# Patient Record
Sex: MALE | Race: WHITE | Employment: FULL TIME | URBAN - METROPOLITAN AREA
[De-identification: names, ages, dates, MRNs, and addresses within clinical notes are randomized per-mention and may not be internally consistent; named-entity substitution may affect disease eponyms.]

---

## 2017-02-06 ENCOUNTER — OFFICE VISIT (OUTPATIENT)
Dept: FAMILY MEDICINE | Facility: CLINIC | Age: 42
End: 2017-02-06
Payer: OTHER MISCELLANEOUS

## 2017-02-06 VITALS
TEMPERATURE: 98.7 F | HEART RATE: 75 BPM | SYSTOLIC BLOOD PRESSURE: 144 MMHG | BODY MASS INDEX: 27.13 KG/M2 | WEIGHT: 179 LBS | DIASTOLIC BLOOD PRESSURE: 90 MMHG | HEIGHT: 68 IN

## 2017-02-06 DIAGNOSIS — S60.459A INFECTED SLIVER OF SKIN OF FINGER, INITIAL ENCOUNTER: Primary | ICD-10-CM

## 2017-02-06 DIAGNOSIS — L08.9 INFECTED SLIVER OF SKIN OF FINGER, INITIAL ENCOUNTER: Primary | ICD-10-CM

## 2017-02-06 PROCEDURE — 99213 OFFICE O/P EST LOW 20 MIN: CPT | Performed by: FAMILY MEDICINE

## 2017-02-06 RX ORDER — CEPHALEXIN 500 MG/1
500 CAPSULE ORAL 4 TIMES DAILY
Qty: 28 CAPSULE | Refills: 0 | Status: SHIPPED | OUTPATIENT
Start: 2017-02-06 | End: 2017-02-13

## 2017-02-06 NOTE — PROGRESS NOTES
SUBJECTIVE:                                                    Ed Arshad is a 41 year old male who presents to clinic today for the following health issues:  Chief Complaint   Patient presents with     Foreign Body in Skin     Pt has a sliver in his right middle finger.         Sliver-thorn in finger      Duration: 1 1/2 wks ago    Description (location/character/radiation): right middle finger    Intensity:  moderate, 5/10    Accompanying signs and symptoms: red, swollen, painful, hard to bend     History (similar episodes/previous evaluation): None    Precipitating or alleviating factors: he thought he got the whole thorn out when it happened, has started hurting over the past 4 days    Therapies tried and outcome: has put abx ointment on past couple days   Verified above history with patient.      Problem list and histories reviewed & adjusted, as indicated.  Additional history: as documented    Patient Active Problem List   Diagnosis     Moderate recurrent major depression (H)     Hyperlipidemia with target LDL less than 130     Elevated blood pressure reading without diagnosis of hypertension     Past Surgical History   Procedure Laterality Date     Uvulopalatopharyngoplasty  1/25/2012     Procedure:UVULOPALATOPHARYNGOPLASTY; Uvuloplasty  ; Surgeon:ARIC SAUCEDO; Location:[place] OR       Social History   Substance Use Topics     Smoking status: Current Every Day Smoker -- 0.25 packs/day for 20 years     Types: Cigarettes     Smokeless tobacco: Never Used     Alcohol Use: Yes      Comment: weekends     Family History   Problem Relation Age of Onset     Hypertension Mother      Depression Mother      Other Cancer Mother      Skin cancer.     Hypertension Father      CEREBROVASCULAR DISEASE Father      Thyroid Disease Father      Prostate Cancer Father      Hypertension Brother          Current Outpatient Prescriptions   Medication Sig Dispense Refill     cephALEXin (KEFLEX) 500 MG capsule Take 1  "capsule (500 mg) by mouth 4 times daily for 7 days 28 capsule 0     Allergies   Allergen Reactions     Nkda [No Known Drug Allergies]        ROS:  C: NEGATIVE for fever, chills, change in weight  INTEGUMENTARY/SKIN: see above  MUSCULOSKELETAL: see above  H: NEGATIVE for bleeding problems   OBJECTIVE:                                                    /90 mmHg  Pulse 75  Temp(Src) 98.7  F (37.1  C) (Tympanic)  Ht 5' 8.25\" (1.734 m)  Wt 179 lb (81.194 kg)  BMI 27.00 kg/m2  Body mass index is 27 kg/(m^2).  GEN: alert, oriented x 3, NAD  SKIN: no open wound  RIGHT MIDDLE FINGER: PIP with trace swelling and trace erythema of the dorsal aspect with mild tenderness on palpation but no fluctuance; slight limited flexion due tightness; no visible puncture site or palpable FB.     Diagnostic test results:  Diagnostic Test Results:  none      ASSESSMENT/PLAN:                                                        ICD-10-CM    1. Infected sliver of skin of finger, initial encounter S60.459A cephALEXin (KEFLEX) 500 MG capsule    L08.9    Discussed with patient no sign of fluctuance that would need I&D.   Discussed localized infection likely; discussed course and tx.  Patient concurred with empiric abx as above.  Discussed blind exploration for retained FB is difficult and carries more risk than benefit.  Patient concurred.  Return precautions discussed and given to patient.        Follow up with Provider - 2-3 days if worsening   Patient Instructions   Likely localized infection or cellulitis of the middle finger midshaft.  Start Cephalexin as prescribed x 7 days.  Warm compress 10 mins 2-3 x a day for a few days.  If worsening, severe pain, fever or skin breakdown, see provider promptly.  Cellulitis  Cellulitis is an infection of the deep layers of skin. A break in the skin, such as a cut or scratch, can let bacteria under the skin. If the bacteria get to deep layers of the skin, it can be serious. If not treated, " cellulitis can get into the bloodstream and lymph nodes. The infection can then spread throughout the body. This causes serious illness.  Cellulitis causes the affected skin to become red, swollen, warm, and sore. The reddened areas have a visible border. An open sore may leak fluid (pus). You may have a fever, chills, and pain.  Cellulitis is treated with antibiotics taken for 7 to 10 days. An open sore may be cleaned and covered with cool wet gauze. Symptoms should get better 1 to 2 days after treatment is started. Make sure to take all the antibiotics for the full number of days until they are gone. Keep taking the medicine even if your symptoms go away.  Home care  Follow these tips:    Limit the use of the part of your body with cellulitis. Movement can cause the infection to spread.    If the infection is on your leg, walk as little as possible in the first few days of the treatment. Keep your leg raised while sitting. This will help to reduce swelling.    Take all of the antibiotic medicine exactly as directed until it is gone. Do not miss any doses, especially during the first 7 days. Don t stop taking the medicine when your symptoms get better.    Keep the affected area clean and dry.    Wash your hands with soap and warm water before and after touching your skin. Anyone else who touches your skin should also wash his or her hands. Don't share towels.  Follow-up care  Follow up with your healthcare provider. If your infection does not go away on 1 antibiotic, your healthcare provider will prescribe a different one.  When to seek medical advice  Call your healthcare provider right away if any of these occur:    Red areas that spread    Swelling or pain that gets worse    Fluid leaking from the skin (pus)    Fever higher of 100.4  F (38.0  C) or higher after 2 days on antibiotics    2418-6362 [employer]. [address] [place] [address], [place] [address]. All rights reserved. This information is not  intended as a substitute for professional medical care. Always follow your healthcare professional's instructions.              Mk Crawford MD  [facility]

## 2017-02-06 NOTE — MR AVS SNAPSHOT
"              After Visit Summary   2/6/2017    Ed Arshad    MRN: [id number]           Patient Information     Date Of Birth          1975        Visit Information        Provider Department      2/6/2017 2:20 PM Mk Crawford MD [facility]        Today's Diagnoses     Infected sliver of skin of finger, initial encounter    -  1       Care Instructions    Likely localized infection or cellulitis of the middle finger midshaft.  Start Cephalexin as prescribed x 7 days.  Warm compress 10 mins 2-3 x a day for a few days.  If worsening, severe pain, fever or skin breakdown, see provider promptly.        Follow-ups after your visit        Who to contact     If you have questions or need follow up information about today's clinic visit or your schedule please contact [facility] directly at [phone].  Normal or non-critical lab and imaging results will be communicated to you by [employer], letter or phone within 4 business days after the clinic has received the results. If you do not hear from us within 7 days, please contact the clinic through MyChart or phone. If you have a critical or abnormal lab result, we will notify you by phone as soon as possible.  Submit refill requests through [employer] or call your pharmacy and they will forward the refill request to us. Please allow 3 business days for your refill to be completed.          Additional Information About Your Visit        MyChart Information     Med Access lets you send messages to your doctor, view your test results, renew your prescriptions, schedule appointments and more. To sign up, go to [email or web address] . Click on \"Log in\" on the left side of the screen, which will take you to the Welcome page. Then click on \"Sign up Now\" on the right side of the page.     You will be asked to enter the access code listed below, as well as some personal information. Please follow the directions to create your username " "and password.     Your access code is: [email or web address]  Expires: 2017  2:26 PM     Your access code will  in 90 days. If you need help or a new code, please call your [place] clinic or [phone].        Care EveryWhere ID     This is your Care EveryWhere ID. This could be used by other organizations to access your [place] medical records  [id number]        Your Vitals Were     Pulse Temperature Height BMI (Body Mass Index)          75 98.7  F (37.1  C) (Tympanic) 5' 8.25\" (1.734 m) 27.00 kg/m2         Blood Pressure from Last 3 Encounters:   17 144/90   16 131/86   03/15/16 136/96    Weight from Last 3 Encounters:   17 179 lb (81.194 kg)   16 174 lb (78.926 kg)   03/15/16 182 lb (82.555 kg)              Today, you had the following     No orders found for display         Today's Medication Changes          These changes are accurate as of: 17  2:26 PM.  If you have any questions, ask your nurse or doctor.               Start taking these medicines.        Dose/Directions    cephALEXin 500 MG capsule   Commonly known as:  KEFLEX   Used for:  Infected sliver of skin of finger, initial encounter   Started by:  Mk Crawford MD        Dose:  500 mg   Take 1 capsule (500 mg) by mouth 4 times daily for 7 days   Quantity:  28 capsule   Refills:  0            Where to get your medicines      These medications were sent to [facility] [address]  [address] [address]     Phone:  [phone]    - cephALEXin 500 MG capsule             Primary Care Provider Office Phone # Fax #    Kevin Yu [phone]       [facility] [address] [address]        Thank you!     Thank you for choosing [facility]  for your care. Our goal is always to provide you with excellent care. Hearing back from our patients is one way we can continue to improve our services. " Please take a few minutes to complete the written survey that you may receive in the mail after your visit with us. Thank you!             Your Updated Medication List - Protect others around you: Learn how to safely use, store and throw away your medicines at www.disposemymeds.org.          This list is accurate as of: 2/6/17  2:26 PM.  Always use your most recent med list.                   Brand Name Dispense Instructions for use    cephALEXin 500 MG capsule    KEFLEX    28 capsule    Take 1 capsule (500 mg) by mouth 4 times daily for 7 days

## 2017-02-06 NOTE — NURSING NOTE
"Chief Complaint   Patient presents with     Foreign Body in Skin     Pt has a sliver in his right middle finger.       Initial /91 mmHg  Pulse 75  Temp(Src) 98.7  F (37.1  C) (Tympanic)  Ht 5' 8.25\" (1.734 m)  Wt 179 lb (81.194 kg)  BMI 27.00 kg/m2 Estimated body mass index is 27 kg/(m^2) as calculated from the following:    Height as of this encounter: 5' 8.25\" (1.734 m).    Weight as of this encounter: 179 lb (81.194 kg).  Medication Reconciliation: complete  Lisbeth Sampson CMA    "

## 2017-02-06 NOTE — PATIENT INSTRUCTIONS
Likely localized infection or cellulitis of the middle finger midshaft.  Start Cephalexin as prescribed x 7 days.  Warm compress 10 mins 2-3 x a day for a few days.  If worsening, severe pain, fever or skin breakdown, see provider promptly.  Cellulitis  Cellulitis is an infection of the deep layers of skin. A break in the skin, such as a cut or scratch, can let bacteria under the skin. If the bacteria get to deep layers of the skin, it can be serious. If not treated, cellulitis can get into the bloodstream and lymph nodes. The infection can then spread throughout the body. This causes serious illness.  Cellulitis causes the affected skin to become red, swollen, warm, and sore. The reddened areas have a visible border. An open sore may leak fluid (pus). You may have a fever, chills, and pain.  Cellulitis is treated with antibiotics taken for 7 to 10 days. An open sore may be cleaned and covered with cool wet gauze. Symptoms should get better 1 to 2 days after treatment is started. Make sure to take all the antibiotics for the full number of days until they are gone. Keep taking the medicine even if your symptoms go away.  Home care  Follow these tips:    Limit the use of the part of your body with cellulitis. Movement can cause the infection to spread.    If the infection is on your leg, walk as little as possible in the first few days of the treatment. Keep your leg raised while sitting. This will help to reduce swelling.    Take all of the antibiotic medicine exactly as directed until it is gone. Do not miss any doses, especially during the first 7 days. Don t stop taking the medicine when your symptoms get better.    Keep the affected area clean and dry.    Wash your hands with soap and warm water before and after touching your skin. Anyone else who touches your skin should also wash his or her hands. Don't share towels.  Follow-up care  Follow up with your healthcare provider. If your infection does not go away  on 1 antibiotic, your healthcare provider will prescribe a different one.  When to seek medical advice  Call your healthcare provider right away if any of these occur:    Red areas that spread    Swelling or pain that gets worse    Fluid leaking from the skin (pus)    Fever higher of 100.4  F (38.0  C) or higher after 2 days on antibiotics    6107-5014 The [employer]. [address] [place] [address]. All rights reserved. This information is not intended as a substitute for professional medical care. Always follow your healthcare professional's instructions.

## 2017-02-14 ENCOUNTER — OFFICE VISIT (OUTPATIENT)
Dept: FAMILY MEDICINE | Facility: CLINIC | Age: 42
End: 2017-02-14
Payer: OTHER MISCELLANEOUS

## 2017-02-14 ENCOUNTER — TELEPHONE (OUTPATIENT)
Dept: FAMILY MEDICINE | Facility: CLINIC | Age: 42
End: 2017-02-14

## 2017-02-14 VITALS
TEMPERATURE: 98.6 F | HEART RATE: 95 BPM | HEIGHT: 68 IN | SYSTOLIC BLOOD PRESSURE: 128 MMHG | BODY MASS INDEX: 27.58 KG/M2 | WEIGHT: 182 LBS | DIASTOLIC BLOOD PRESSURE: 82 MMHG

## 2017-02-14 DIAGNOSIS — L03.011 CELLULITIS OF MIDDLE FINGER, RIGHT: Primary | ICD-10-CM

## 2017-02-14 DIAGNOSIS — M79.89 SWELLING OF RIGHT MIDDLE FINGER: Primary | ICD-10-CM

## 2017-02-14 PROCEDURE — 99214 OFFICE O/P EST MOD 30 MIN: CPT | Performed by: FAMILY MEDICINE

## 2017-02-14 RX ORDER — CLINDAMYCIN HCL 300 MG
300 CAPSULE ORAL 4 TIMES DAILY
Qty: 40 CAPSULE | Refills: 0 | Status: SHIPPED | OUTPATIENT
Start: 2017-02-14 | End: 2017-03-24

## 2017-02-14 RX ORDER — INDOMETHACIN 25 MG/1
25 CAPSULE ORAL 2 TIMES DAILY PRN
Qty: 20 CAPSULE | Refills: 1 | Status: SHIPPED | OUTPATIENT
Start: 2017-02-14 | End: 2017-03-24

## 2017-02-14 NOTE — PROGRESS NOTES
SUBJECTIVE:                                                    Ed Arshad is a 41 year old male who presents to clinic today for the following health issues:  Chief Complaint   Patient presents with     Cellulitis     Pt here for a f/u on right middle finger cellultis.  No better.         F/u on cellulitis - right middle finger      Duration: 3 wks    Description (location/character/radiation): right middle finger, PIP; pt had thorn from [employer] bush he thought he removed, felt maybe he still had a little bit in there, finger started hurting 4-5 days later    Intensity:  7/10 with bending    Accompanying signs and symptoms: swelling, warm, mildly red skin over the area    History (similar episodes/previous evaluation): No similar episde in past.    Precipitating or alleviating factors: None    Therapies tried and outcome: pt just finished 7 day course of cephalexin last night    Patient states the joint feels tighter now and flexion is decreased compared to last week.       Problem list and histories reviewed & adjusted, as indicated.  Additional history: as documented    Patient Active Problem List   Diagnosis     Moderate recurrent major depression (H)     Hyperlipidemia with target LDL less than 130     Elevated blood pressure reading without diagnosis of hypertension     Past Surgical History   Procedure Laterality Date     Uvulopalatopharyngoplasty  1/25/2012     Procedure:UVULOPALATOPHARYNGOPLASTY; Uvuloplasty  ; Surgeon:ARIC SAUCEDO; Location:[place] OR       Social History   Substance Use Topics     Smoking status: Current Every Day Smoker     Packs/day: 0.25     Years: 20.00     Types: Cigarettes     Smokeless tobacco: Never Used     Alcohol use Yes      Comment: weekends     Family History   Problem Relation Age of Onset     Hypertension Mother      Depression Mother      Other Cancer Mother      Skin cancer.     Hypertension Father      CEREBROVASCULAR DISEASE Father      Thyroid Disease  "Father      Prostate Cancer Father      Hypertension Brother          Current Outpatient Prescriptions   Medication Sig Dispense Refill     clindamycin (CLEOCIN) 300 MG capsule Take 1 capsule (300 mg) by mouth 4 times daily 40 capsule 0     Allergies   Allergen Reactions     Nkda [No Known Drug Allergies]        ROS:  C: NEGATIVE for fever, chills, change in weight  INTEGUMENTARY/SKIN: see above  MUSCULOSKELETAL: see above  H: NEGATIVE for bleeding problems    OBJECTIVE:                                                    /82 (BP Location: Right arm, Patient Position: Chair, Cuff Size: Adult Regular)  Pulse 95  Temp 98.6  F (37  C) (Tympanic)  Ht 5' 8.25\" (1.734 m)  Wt 182 lb (82.6 kg)  BMI 27.47 kg/m2  Body mass index is 27.47 kg/(m^2).  GEN: alert, oriented x 3, NAD  SKIN: good turgor; trace erythema on the right middle finger PIP area.  RIGHT MIDDLE FINGER: increased swelling over PIP with increased tenderness compared to previous visit; no open ulcer/wound; no fluctuance today; range of motion shows moderately limited flexion due to swelling/tightness.    Diagnostic test results:  Diagnostic Test Results:  none      ASSESSMENT/PLAN:                                                        ICD-10-CM    1. Cellulitis of middle finger, right L03.011 clindamycin (CLEOCIN) 300 MG capsule     ORTHO  REFERRAL  Increased swelling today with limited flexion.  Possible incomplete response to cephalexin. Trial of Clindamycin.  Due to possible retained foreign body, consult ortho for possible need to explore for it.  Return precautions discussed and given to patient.         Follow up with Provider - at ortho appt.   Patient Instructions   Start Clindamycin due to persistent cellulitis.    Due to possible retained foreign body, ortho referral has been sent.  You will be contacted in 1-2 business days to get a schedule for the orthopedic specialist.    If you have fever, expanding swelling, severe pain or break " in skin, go to ER or see provider promptly.        Mk Crawford MD  [facility]

## 2017-02-14 NOTE — NURSING NOTE
"Chief Complaint   Patient presents with     Cellulitis     Pt here for a f/u on right middle finger cellultis.  No better.       Initial /82 (BP Location: Right arm, Patient Position: Chair, Cuff Size: Adult Regular)  Pulse 95  Temp 98.6  F (37  C) (Tympanic)  Ht 5' 8.25\" (1.734 m)  Wt 182 lb (82.6 kg)  BMI 27.47 kg/m2 Estimated body mass index is 27.47 kg/(m^2) as calculated from the following:    Height as of this encounter: 5' 8.25\" (1.734 m).    Weight as of this encounter: 182 lb (82.6 kg).  Medication Reconciliation: complete  Lisbeth Sampson CMA    "

## 2017-02-14 NOTE — MR AVS SNAPSHOT
After Visit Summary   2/14/2017    Ed Arshad    MRN: [id number]           Patient Information     Date Of Birth          1975        Visit Information        Provider Department      2/14/2017 9:40 AM Mk Crawford MD [facility]        Today's Diagnoses     Cellulitis of middle finger, right    -  1      Care Instructions    Start Clindamycin due to persistent cellulitis.    Due to possible retained foreign body, ortho referral has been sent.  You will be contacted in 1-2 business days to get a schedule for the orthopedic specialist.    If you have fever, expanding swelling, severe pain or break in skin, go to ER or see provider promptly.          Follow-ups after your visit        Additional Services     ORTHO  REFERRAL       [facility] is referring you to the Orthopedic  Services at [facility].       The  Representative will assist you in the coordination of your Orthopedic and Musculoskeletal Care as prescribed by your physician.    The  Representative will call you within 1 business day to help schedule your appointment, or you may contact the  Representative at:    All areas ~ [phone]     Type of Referral : Hand Ortho - persistent right middle finger swelling/cellulitis, possible retained foreign body       Timeframe requested: 1 - 2 days    Coverage of these services is subject to the terms and limitations of your health insurance plan.  Please call member services at your health plan with any benefit or coverage questions.      If X-rays, CT or MRI's have been performed, please contact the facility where they were done to arrange for , prior to your scheduled appointment.  Please bring this referral request to your appointment and present it to your specialist.                  Follow-up notes from your care team     Return at ortho appointment.      Who to  "contact     If you have questions or need follow up information about today's clinic visit or your schedule please contact [facility] directly at [phone].  Normal or non-critical lab and imaging results will be communicated to you by MyChart, letter or phone within 4 business days after the clinic has received the results. If you do not hear from us within 7 days, please contact the clinic through MyChart or phone. If you have a critical or abnormal lab result, we will notify you by phone as soon as possible.  Submit refill requests through [employer] or call your pharmacy and they will forward the refill request to us. Please allow 3 business days for your refill to be completed.          Additional Information About Your Visit        [employer] Information     [employer] lets you send messages to your doctor, view your test results, renew your prescriptions, schedule appointments and more. To sign up, go to [email or web address] . Click on \"Log in\" on the left side of the screen, which will take you to the Welcome page. Then click on \"Sign up Now\" on the right side of the page.     You will be asked to enter the access code listed below, as well as some personal information. Please follow the directions to create your username and password.     Your access code is: [email or web address]  Expires: 2017  2:26 PM     Your access code will  in 90 days. If you need help or a new code, please call your [place] clinic or [phone].        Care EveryWhere ID     This is your Care EveryWhere ID. This could be used by other organizations to access your [place] medical records  [id number]        Your Vitals Were     Pulse Temperature Height BMI (Body Mass Index)          95 98.6  F (37  C) (Tympanic) 5' 8.25\" (1.734 m) 27.47 kg/m2         Blood Pressure from Last 3 Encounters:   17 128/82   17 144/90   16 131/86    Weight from Last 3 Encounters:   17 182 lb (82.6 kg)   17 179 " lb (81.2 kg)   05/25/16 174 lb (78.9 kg)              We Performed the Following     ORTHO  REFERRAL          Today's Medication Changes          These changes are accurate as of: 2/14/17  9:59 AM.  If you have any questions, ask your nurse or doctor.               Start taking these medicines.        Dose/Directions    clindamycin 300 MG capsule   Commonly known as:  CLEOCIN   Used for:  Cellulitis of middle finger, right   Started by:  Mk Crawford MD        Dose:  300 mg   Take 1 capsule (300 mg) by mouth 4 times daily   Quantity:  40 capsule   Refills:  0            Where to get your medicines      These medications were sent to [facility] [address]  [address] [facility] [address] [facility] [address]     Phone:  [phone]     clindamycin 300 MG capsule                Primary Care Provider Office Phone # Fax #    Kevin Yu [phone]       [facility] [address] [facility] [address]        Thank you!     Thank you for choosing [facility]  for your care. Our goal is always to provide you with excellent care. Hearing back from our patients is one way we can continue to improve our services. Please take a few minutes to complete the written survey that you may receive in the mail after your visit with us. Thank you!             Your Updated Medication List - Protect others around you: Learn how to safely use, store and throw away your medicines at www.disposemymeds.org.          This list is accurate as of: 2/14/17  9:59 AM.  Always use your most recent med list.                   Brand Name Dispense Instructions for use    clindamycin 300 MG capsule    CLEOCIN    40 capsule    Take 1 capsule (300 mg) by mouth 4 times daily

## 2017-02-14 NOTE — TELEPHONE ENCOUNTER
Patient was seen today for right middle finger swelling.  He was given clindamycin today for cellulitis.  After revieewing his chart again, I recommend giving a trial of Indomethacin for next 2-3 days for the swelling.  If patient concurs with this, presctiption will be sent.

## 2017-02-14 NOTE — PATIENT INSTRUCTIONS
Start Clindamycin due to persistent cellulitis.    Due to possible retained foreign body, ortho referral has been sent.  You will be contacted in 1-2 business days to get a schedule for the orthopedic specialist.    If you have fever, expanding swelling, severe pain or break in skin, go to ER or see provider promptly.

## 2017-02-16 DIAGNOSIS — L03.011 CELLULITIS OF FINGER, RIGHT: Primary | ICD-10-CM

## 2017-02-16 LAB
BASOPHILS # BLD AUTO: 0.1 10E9/L (ref 0–0.2)
BASOPHILS NFR BLD AUTO: 0.8 %
CRP SERPL-MCNC: <2.9 MG/L (ref 0–8)
DIFFERENTIAL METHOD BLD: ABNORMAL
EOSINOPHIL # BLD AUTO: 0.2 10E9/L (ref 0–0.7)
EOSINOPHIL NFR BLD AUTO: 1.5 %
ERYTHROCYTE [DISTWIDTH] IN BLOOD BY AUTOMATED COUNT: 13.3 % (ref 10–15)
ERYTHROCYTE [SEDIMENTATION RATE] IN BLOOD BY WESTERGREN METHOD: 3 MM/H (ref 0–15)
HCT VFR BLD AUTO: 50.4 % (ref 40–53)
HGB BLD-MCNC: 17.1 G/DL (ref 13.3–17.7)
LYMPHOCYTES # BLD AUTO: 1.6 10E9/L (ref 0.8–5.3)
LYMPHOCYTES NFR BLD AUTO: 13.5 %
MCH RBC QN AUTO: 29.9 PG (ref 26.5–33)
MCHC RBC AUTO-ENTMCNC: 33.9 G/DL (ref 31.5–36.5)
MCV RBC AUTO: 88 FL (ref 78–100)
MONOCYTES # BLD AUTO: 0.9 10E9/L (ref 0–1.3)
MONOCYTES NFR BLD AUTO: 7.8 %
NEUTROPHILS # BLD AUTO: 8.9 10E9/L (ref 1.6–8.3)
NEUTROPHILS NFR BLD AUTO: 76.4 %
PLATELET # BLD AUTO: 211 10E9/L (ref 150–450)
RBC # BLD AUTO: 5.72 10E12/L (ref 4.4–5.9)
WBC # BLD AUTO: 11.6 10E9/L (ref 4–11)

## 2017-02-16 PROCEDURE — 86140 C-REACTIVE PROTEIN: CPT | Performed by: ORTHOPAEDIC SURGERY

## 2017-02-16 PROCEDURE — 85025 COMPLETE CBC W/AUTO DIFF WBC: CPT | Performed by: ORTHOPAEDIC SURGERY

## 2017-02-16 PROCEDURE — 85652 RBC SED RATE AUTOMATED: CPT | Performed by: ORTHOPAEDIC SURGERY

## 2017-02-16 PROCEDURE — 36415 COLL VENOUS BLD VENIPUNCTURE: CPT | Performed by: ORTHOPAEDIC SURGERY

## 2017-03-04 ENCOUNTER — TRANSFERRED RECORDS (OUTPATIENT)
Dept: HEALTH INFORMATION MANAGEMENT | Facility: CLINIC | Age: 42
End: 2017-03-04

## 2017-03-06 ENCOUNTER — MYC MEDICAL ADVICE (OUTPATIENT)
Dept: FAMILY MEDICINE | Facility: CLINIC | Age: 42
End: 2017-03-06

## 2017-03-28 ENCOUNTER — HOSPITAL ENCOUNTER (OUTPATIENT)
Facility: CLINIC | Age: 42
Discharge: HOME | End: 2017-03-28
Attending: ORTHOPAEDIC SURGERY | Admitting: ORTHOPAEDIC SURGERY
Payer: OTHER MISCELLANEOUS

## 2017-03-28 VITALS
SYSTOLIC BLOOD PRESSURE: 139 MMHG | BODY MASS INDEX: 26.36 KG/M2 | TEMPERATURE: 98.2 F | WEIGHT: 178 LBS | HEIGHT: 69 IN | DIASTOLIC BLOOD PRESSURE: 95 MMHG | OXYGEN SATURATION: 97 % | RESPIRATION RATE: 18 BRPM

## 2017-03-28 PROCEDURE — S0020 INJECTION, BUPIVICAINE HYDRO: HCPCS | Performed by: ORTHOPAEDIC SURGERY

## 2017-03-28 PROCEDURE — 87070 CULTURE OTHR SPECIMN AEROBIC: CPT | Performed by: ORTHOPAEDIC SURGERY

## 2017-03-28 PROCEDURE — 25000128 H RX IP 250 OP 636: Performed by: ORTHOPAEDIC SURGERY

## 2017-03-28 PROCEDURE — 36000058 ZZH SURGERY LEVEL 3 EA 15 ADDTL MIN: Performed by: ORTHOPAEDIC SURGERY

## 2017-03-28 PROCEDURE — 87176 TISSUE HOMOGENIZATION CULTR: CPT | Performed by: ORTHOPAEDIC SURGERY

## 2017-03-28 PROCEDURE — 25800025 ZZH RX 258: Performed by: ORTHOPAEDIC SURGERY

## 2017-03-28 PROCEDURE — 87075 CULTR BACTERIA EXCEPT BLOOD: CPT | Performed by: ORTHOPAEDIC SURGERY

## 2017-03-28 PROCEDURE — 36000056 ZZH SURGERY LEVEL 3 1ST 30 MIN: Performed by: ORTHOPAEDIC SURGERY

## 2017-03-28 PROCEDURE — 71000027 ZZH RECOVERY PHASE 2 EACH 15 MINS: Performed by: ORTHOPAEDIC SURGERY

## 2017-03-28 PROCEDURE — 87102 FUNGUS ISOLATION CULTURE: CPT | Performed by: ORTHOPAEDIC SURGERY

## 2017-03-28 PROCEDURE — 27210794 ZZH OR GENERAL SUPPLY STERILE: Performed by: ORTHOPAEDIC SURGERY

## 2017-03-28 PROCEDURE — 40000305 ZZH STATISTIC PRE PROC ASSESS I: Performed by: ORTHOPAEDIC SURGERY

## 2017-03-28 PROCEDURE — 25000125 ZZHC RX 250: Performed by: ORTHOPAEDIC SURGERY

## 2017-03-28 RX ORDER — SODIUM CHLORIDE, SODIUM LACTATE, POTASSIUM CHLORIDE, CALCIUM CHLORIDE 600; 310; 30; 20 MG/100ML; MG/100ML; MG/100ML; MG/100ML
INJECTION, SOLUTION INTRAVENOUS CONTINUOUS
Status: DISCONTINUED | OUTPATIENT
Start: 2017-03-28 | End: 2017-03-28 | Stop reason: HOSPADM

## 2017-03-28 RX ORDER — BUPIVACAINE HYDROCHLORIDE 5 MG/ML
INJECTION, SOLUTION PERINEURAL PRN
Status: DISCONTINUED | OUTPATIENT
Start: 2017-03-28 | End: 2017-03-28 | Stop reason: HOSPADM

## 2017-03-28 RX ORDER — CEFAZOLIN SODIUM 2 G/100ML
2 INJECTION, SOLUTION INTRAVENOUS
Status: COMPLETED | OUTPATIENT
Start: 2017-03-28 | End: 2017-03-28

## 2017-03-28 RX ORDER — BACITRACIN ZINC 500 [USP'U]/G
OINTMENT TOPICAL PRN
Status: DISCONTINUED | OUTPATIENT
Start: 2017-03-28 | End: 2017-03-28 | Stop reason: HOSPADM

## 2017-03-28 RX ORDER — CEFAZOLIN SODIUM 1 G/3ML
1 INJECTION, POWDER, FOR SOLUTION INTRAMUSCULAR; INTRAVENOUS SEE ADMIN INSTRUCTIONS
Status: DISCONTINUED | OUTPATIENT
Start: 2017-03-28 | End: 2017-03-28 | Stop reason: HOSPADM

## 2017-03-28 RX ADMIN — LIDOCAINE HYDROCHLORIDE 1 ML: 10 INJECTION, SOLUTION INFILTRATION; PERINEURAL at 14:55

## 2017-03-28 RX ADMIN — SODIUM CHLORIDE, POTASSIUM CHLORIDE, SODIUM LACTATE AND CALCIUM CHLORIDE: 600; 310; 30; 20 INJECTION, SOLUTION INTRAVENOUS at 14:55

## 2017-03-28 RX ADMIN — CEFAZOLIN SODIUM 2 G: 2 INJECTION, SOLUTION INTRAVENOUS at 16:25

## 2017-03-28 NOTE — DISCHARGE INSTRUCTIONS
Same Day Surgery Discharge Instructions  Special Precautions After Surgery - Adult    1. It is not unusual to feel lightheaded or faint, up to 24 hours after surgery or while taking pain medication.  If you have these symptoms; sit for a few minutes before standing and have someone assist you when getting up.  2. You should rest and relax for the next 24 hours and must have someone stay with you for at least 24 hours after your discharge.  3. DO NOT DRIVE any vehicle or operate mechanical equipment for 24 hours following the end of your surgery.  DO NOT DRIVE while taking narcotic pain medications that have been prescribed by your physician.  If you had a limb operated on, you must be able to use it fully to drive.  4. DO NOT drink alcoholic beverages for 24 hours following surgery or while taking prescription pain medication.  5. Drink clear liquids (apple juice, ginger ale, broth, 7-Up, etc.).  Progress to your regular diet as you feel able.  6. Any questions call your physician and do not make important decisions for 24 hours.        __________________________________________________________________________________________________________________________________  IMPORTANT NUMBERS:    [facility] Main Number:  [phone], [phone]  Pharmacy:  [phone]  Same Day Surgery:  [phone], Monday - Friday until 8:30 p.m.  Urgent Care:  [phone]  Emergency Room:  [phone]      [facility]:  [phone]                                                                             [facility]:  [phone] option 1  [facility]:  [phone]     [facility] Clinic:  [phone]   [facility]:  [phone]   Home Medical Equipment: [phone]  [facility]:  [phone]

## 2017-03-28 NOTE — IP AVS SNAPSHOT
[facility] PreOP/Phase II    [address] [address]    Phone:  [phone]    Fax:  [phone]                                       After Visit Summary   3/28/2017    Ed Arshad    MRN: [id number]           After Visit Summary Signature Page     I have received my discharge instructions, and my questions have been answered. I have discussed any challenges I see with this plan with the nurse or doctor.    ..........................................................................................................................................  Patient/Patient Representative Signature      ..........................................................................................................................................  Patient Representative Print Name and Relationship to Patient    ..................................................               ................................................  Date                                            Time    ..........................................................................................................................................  Reviewed by Signature/Title    ...................................................              ..............................................  Date                                                            Time

## 2017-03-28 NOTE — BRIEF OP NOTE
[facility]  Brief Operative Note      Pre-operative diagnosis: Foreign body right long finger   Post-operative diagnosis: Same   Procedure: Right long finger I&D with foreign body removal   Surgeon: Marion Lafleur MD     Assistant(s): Demetra Camacho PA-C   Anesthesia: Local anesthesia   Estimated blood loss: Minimal               Drains: None   Specimens: Right long finger aerobic, anaerobic and fungal cultures       Findings: See full dictated operative note for details   Complications: None                   Comments: See dictated operative report for full details     Condition: Stable   Weight bearing status: Non-weight bearing   Activity: Activity as tolerated  Patient may move about with assist as indicated or with supervision   Anticoagulation plan:                 Mechanical and/or ambulation     Follow up plan                           Follow up in 10 day(s)

## 2017-03-28 NOTE — IP AVS SNAPSHOT
MRN:[id number]                      After Visit Summary   3/28/2017    Ed Arshad    MRN: [id number]           Thank you!     Thank you for choosing [facility] for your care. Our goal is always to provide you with excellent care. Hearing back from our patients is one way we can continue to improve our services. Please take a few minutes to complete the written survey that you may receive in the mail after you visit with us. Thank you!        Patient Information     Date Of Birth          1975        About your hospital stay     You were admitted on:  March 28, 2017 You last received care in the:  [facility] PreOP/Phase II    You were discharged on:  March 28, 2017        Reason for your hospital stay       Right long finger retained foreign body                  Who to Call     For medical emergencies, please call 911.  For non-urgent questions about your medical care, please call your primary care provider or clinic, [phone]  For questions related to your surgery, please call your surgery clinic        Attending Provider     Provider Marion Pope MD Orthopedics       Primary Care Provider Office Phone # Fax #    Kevin Louie Yu [phone]       [facility] [address] [address]         When to contact your care team       Call your primary doctor if you have any of the following: chest pain, troubles breathing, increased shortness of breath.  Call [facility] ([phone]) if you have any of the following: temperature greater than 100.5F, pain not controlled with elevation or pain medications, drainage that saturates the bandage.                  After Care Instructions     Activity       Keep your arm elevated above your heart for the next 2-3 days.  This will limit swelling and help with pain control.  It is ok to apply an ice bag to the area, but make sure there is no leak or chance for  condensation to cause your dressing to get damp.  Wet dressings can lead to infection.  Keep your sterile surgical dressing clean and dry.  Please do no remove any sooner than Friday evening.  Sponge bathing is recommended prior to Friday evening.  Otherwise, cover your arm with plastic bags secured around the upper arm if showering and hold your arm outside of the shower.  OK to move your fingers, wrist, and elbow.    On Friday evening, you may remove your surgical bandage.  The finger may then get wet under running water (showers or sink) only.  NO submerging the finger in standing water (dishwashing, pools, tub bathing).  Pat the finger dry after your shower and cover with a clean gauze dressing.  No heavy lifting, pushing, pulling with the surgical extremity.  No repetitive activities with the surgical hand/wrist.            Diet       Resume your pre-op diet                  Follow-up Appointments     Follow-up and recommended labs and tests        Follow up with Dr. Lafleur in about 10 days at [facility] ([phone]).  You may need to call to schedule this appointment if you do not already have a follow-up appointment scheduled.                  Further instructions from your care team                           Same Day Surgery Discharge Instructions  Special Precautions After Surgery - Adult    1. It is not unusual to feel lightheaded or faint, up to 24 hours after surgery or while taking pain medication.  If you have these symptoms; sit for a few minutes before standing and have someone assist you when getting up.  2. You should rest and relax for the next 24 hours and must have someone stay with you for at least 24 hours after your discharge.  3. DO NOT DRIVE any vehicle or operate mechanical equipment for 24 hours following the end of your surgery.  DO NOT DRIVE while taking narcotic pain medications that have been prescribed by your physician.  If you had a limb operated on, you must be able  "to use it fully to drive.  4. DO NOT drink alcoholic beverages for 24 hours following surgery or while taking prescription pain medication.  5. Drink clear liquids (apple juice, ginger ale, broth, 7-Up, etc.).  Progress to your regular diet as you feel able.  6. Any questions call your physician and do not make important decisions for 24 hours.        __________________________________________________________________________________________________________________________________  IMPORTANT NUMBERS:    [facility] Main Number:  [phone], [phone]  Pharmacy:  [phone]  Same Day Surgery:  [phone], Monday - Friday until 8:30 p.m.  Urgent Care:  [phone]  Emergency Room:  [phone]      [facility]:  [phone]                                                                             [facility]:  [phone] option 1  [facility]:  [phone]     [facility] Clinic:  [phone]   [facility]ty Clinic:  [phone]   Home Medical Equipment: [phone]  [facility]:  [phone]        Pending Results     No orders found from 3/26/2017 to 3/29/2017.            Admission Information     Date & Time Provider Department Dept. Phone    3/28/2017 Marion Lafleur MD [facility] PreOP/Phase [phone]      Your Vitals Were     Blood Pressure Temperature Respirations Height Weight Pulse Oximetry    139/95 98.2  F (36.8  C) (Oral) 18 1.74 m (5' 8.5\") 80.7 kg (178 lb) 97%    BMI (Body Mass Index)                   26.67 kg/m2           [facility] Information     [employer] gives you secure access to your electronic health record. If you see a primary care provider, you can also send messages to your care team and make appointments. If you have questions, please call your primary care clinic.  If you do not have a primary care provider, please call [phone] and they will assist you.        Care EveryWhere ID     This is your Care EveryWhere " ID. This could be used by other organizations to access your [facility] medical records  [id number]           Review of your medicines      Notice     You have not been prescribed any medications.             Protect others around you: Learn how to safely use, store and throw away your medicines at www.disposemymeds.org.             Medication List: This is a list of all your medications and when to take them. Check marks below indicate your daily home schedule. Keep this list as a reference.      Notice     You have not been prescribed any medications.

## 2017-03-29 NOTE — OP NOTE
DATE OF PROCEDURE:  03/28/2017      SURGEON:  Marion Lafleur MD   ASSISTANT:  Demetra Camacho PA-C      PREOPERATIVE DIAGNOSIS:  Right long finger retained foreign body.   POSTOPERATIVE DIAGNOSIS:  Right long finger retained foreign body.      PROCEDURE PERFORMED:   1.  Right long finger irrigation and debridement.   2.  Right long finger foreign body removal.      ANESTHESIA:  Local anesthesia for a left long finger digital block.   SPECIMENS:  Left long finger aerobic, anaerobic and fungal cultures.   DRAINS:  None.   COMPLICATIONS:  None known.      FINDINGS:  A small, organic-material appearing foreign body within the soft tissues immediately dorsal to the common extensor tendon and central slip at the level of the PIP joint.  No evidence of dirty-dishwater-type fluid or necrotic material or purulent fluid in the wound bed.      INDICATIONS:  Ed Arshad is a 41-year-old male who incarcerated a piece of brush in his right long finger several weeks ago.  The patient removed the foreign object himself.  He then went on to develop a superficial wound infection.  This was treated with 2 courses of outpatient antibiotics.  He then presented to my clinic with persistent pain and swelling.  Inflammatory labs were within normal limits.  He continued to have pain with conservative management despite attempts at edema control and therapy for range of motion.  MRI was obtained demonstrating a very small 2 mm foreign body within the dorsal soft tissues.  Treatment options were discussed with the patient at length including continued conservative management versus surgical intervention.  He understands the risks and benefits of each treatment option.  He further understands the risks of surgery include but are not limited to following:  Problems anesthesia, sensitive scars, bleeding, wound healing problems, joint stiffness, damage to surrounding anatomical structures including nerves, vessels or tendons, shree-incisional  numbness, persistent pain, infection, need for additional surgery despite our efforts today.  No guarantees were made or implied.  The patient expressed understanding and wished to proceed with surgical intervention.      DESCRIPTION OF PROCEDURE:  The patient was met in the preoperative holding area and the correct extremity, the right long finger was identified and marked.  He was brought to the operative suite and placed supine on the operating table.  Bony prominences were well padded.  A well-padded nonsterile tourniquet was placed on his right forearm.  Perioperative pause confirmed the correct patient, the correct procedure and the correct extremity.  Just prior to inflation of the tourniquet, palpation was used with the patient wide awake to identify the area of maximal tenderness.  This was marked with an ink pen.  A digital block was then performed with a 1:1 mixture of 1% lidocaine and 0.5% Marcaine plain.  The right upper extremity was then elevated, exsanguinated with an Esmarch bandage and the tourniquet insufflated to 250 mmHg.    Skin was incised sharply with a scalpel longitudinally over the dorsum of the PIP joint in the area of maximal tenderness to palpation.  Full-thickness skin flaps were raised.  Bipolar electrocautery was used for meticulous hemostasis.  Dissection proceeded carefully through subcutaneous tissues.  There was no evidence of necrotic tissue, purulent or dirty dishwater-type fluid.  There was evidence of edema and swelling over the soft tissues immediately dorsal to the PIP joint and joint capsule.  A Bellingham blade was used to carefully incise the soft tissues longitudinally immediately over the common extensor tendon at the central slip.  The common extensor tendon and central slip were not violated and all dissection proceeded superficially to this.  In these superficial tissues a very small piece of organic particulate matter less than 5 mm in area was identified.  A Bellingham  blade was used to carefully excise this as well as surrounding soft tissue, again preserving the underlying common extensor tendon along with its insertion into the central slip.  The underlying extensor tendon and dorsal joint capsule were examined and were found to be without foreign body or traumatic injury.  The patient was then asked to flex and extend the digit multiple times.  There was no evidence of fluid pockets.  The tissue including the retained foreign body was sent a specimen for aerobic and fungal cultures.  Culture swabs for aerobic anaerobic and fungal cultures were obtained at this level as well.  The tourniquet was then deflated after approximately 23 minutes with brisk return of capillary refill to all digits including the long finger.      Wound was copiously irrigated with sterile saline using a hemostat and curet to gently debride the skin and subcutaneous tissues down to the level of the common extensor tendon.  Following the irrigation and debridement, the wound was reapproximated with interrupted sutures using 4-0 nylon.  Sterile dressings were applied consisting of bacitracin, Adaptic, 2 x 2 gauze and a lightly wrapped Rubin gauze followed by a lightly wrapped Coban dressing.  The patient was then taken to recovery in stable condition having tolerated the procedure well.  Sponge and instrument counts were correct at the conclusion of the procedure, which was performed under loupe magnification.      POSTOPERATIVE PLAN:  The patient may change his dressing after 72 hours.  He is to avoid submerging the wound in standing water.  He may continue with range of motion activities as tolerated but is to avoid heavy repetitive lifting or gripping or grasping.  He will return to clinic in approximately 10 days for wound check and suture removal.  Followup intraoperative cultures.  A prescription for Norco 5/325 dispensed today.         MADHAVI OLIVEIRA MD             D: 03/28/2017 17:00   T:  2017 21:53   MT: [id number]      Name:     MASHA AVILA   MRN:      [id number]        Account:        [id number]   :      1975           Procedure Date: 2017      Document: [id number]

## 2017-04-02 LAB
BACTERIA SPEC CULT: NO GROWTH
BACTERIA SPEC CULT: NO GROWTH
Lab: NORMAL
MICRO REPORT STATUS: NORMAL
MICRO REPORT STATUS: NORMAL
SPECIMEN SOURCE: NORMAL
SPECIMEN SOURCE: NORMAL

## 2017-04-04 LAB
BACTERIA SPEC CULT: NORMAL
Lab: NORMAL
MICRO REPORT STATUS: NORMAL
SPECIMEN SOURCE: NORMAL

## 2017-04-25 LAB
FUNGUS SPEC CULT: NORMAL
FUNGUS SPEC CULT: NORMAL
Lab: NORMAL
MICRO REPORT STATUS: NORMAL
MICRO REPORT STATUS: NORMAL
SPECIMEN SOURCE: NORMAL
SPECIMEN SOURCE: NORMAL

## 2018-02-15 ENCOUNTER — HOSPITAL ENCOUNTER (EMERGENCY)
Facility: CLINIC | Age: 43
Discharge: HOME | End: 2018-02-15
Attending: PHYSICIAN ASSISTANT | Admitting: PHYSICIAN ASSISTANT
Payer: COMMERCIAL

## 2018-02-15 ENCOUNTER — APPOINTMENT (OUTPATIENT)
Dept: GENERAL RADIOLOGY | Facility: CLINIC | Age: 43
End: 2018-02-15
Attending: PHYSICIAN ASSISTANT
Payer: COMMERCIAL

## 2018-02-15 VITALS
HEART RATE: 98 BPM | SYSTOLIC BLOOD PRESSURE: 136 MMHG | DIASTOLIC BLOOD PRESSURE: 84 MMHG | OXYGEN SATURATION: 99 % | RESPIRATION RATE: 20 BRPM | TEMPERATURE: 99.7 F

## 2018-02-15 DIAGNOSIS — J40 BRONCHITIS: ICD-10-CM

## 2018-02-15 PROCEDURE — 71046 X-RAY EXAM CHEST 2 VIEWS: CPT

## 2018-02-15 PROCEDURE — 99213 OFFICE O/P EST LOW 20 MIN: CPT | Performed by: PHYSICIAN ASSISTANT

## 2018-02-15 PROCEDURE — G0463 HOSPITAL OUTPT CLINIC VISIT: HCPCS | Mod: 25

## 2018-02-15 RX ORDER — BENZONATATE 100 MG/1
100 CAPSULE ORAL 3 TIMES DAILY PRN
Qty: 42 CAPSULE | Refills: 0 | Status: SHIPPED | OUTPATIENT
Start: 2018-02-15

## 2018-02-15 RX ORDER — DOXYCYCLINE 100 MG/1
100 CAPSULE ORAL 2 TIMES DAILY
Qty: 20 CAPSULE | Refills: 0 | Status: SHIPPED | OUTPATIENT
Start: 2018-02-15 | End: 2018-02-25

## 2018-02-15 NOTE — ED AVS SNAPSHOT
[facility] Emergency Department    [address] [facility] [address]    Phone:  [phone]    Fax:  [phone]                                       Ed Arshad   MRN: [id number]    Department:  [facility] Emergency Department   Date of Visit:  2/15/2018           Patient Information     Date Of Birth          1975        Your diagnoses for this visit were:     Bronchitis        You were seen by Chela Crenshaw PA-C.      Follow-up Information     Follow up with Kevin Yu MD In 3 days.    Specialty:  Family Practice    Why:  As needed if no improvement or sooner if symptoms worsen    Contact information:    [address] [facility] [address]  [phone]          Discharge Instructions         What Is Acute Bronchitis?  Acute bronchitis is when the airways in your lungs (bronchial tubes) become red and swollen (inflamed). It is usually caused by a viral infection. But it can also occur because of a bacteria or allergen. Symptoms include a cough that produces yellow or greenish mucus and can last for days or sometimes weeks.  Inside healthy lungs    Air travels in and out of the lungs through the airways. The linings of these airways produce sticky mucus. This mucus traps particles that enter the lungs. Tiny structures called cilia then sweep the particles out of the airways.     Healthy airway: Airways are normally open. Air moves in and out easily.      Healthy cilia: Tiny, hairlike cilia sweep mucus and particles up and out of the airways.   Lungs with bronchitis  Bronchitis often occurs with a cold or the flu virus. The airways become inflamed (red and swollen). There is a deep hacking cough from the extra mucus. Other symptoms may include:    Wheezing    Chest discomfort    Shortness of breath    Mild fever  A second infection, this time due to bacteria, may then occur. And airways irritated by allergens or smoke are more likely to get infected.        Inflamed  airway: Inflammation and extra mucus narrow the airway, causing shortness of breath.      Impaired cilia: Extra mucus impairs cilia, causing congestion and wheezing. Smoking makes the problem worse.   Making a diagnosis  A physical exam, health history, and certain tests help your healthcare provider make the diagnosis.  Health history  Your healthcare provider will ask you about your symptoms.  The exam  Your provider listens to your chest for signs of congestion. He or she may also check your ears, nose, and throat.  Possible tests    A sputum test for bacteria. This requires a sample of mucus from your lungs.    A nasal or throat swab. This tests to see if you have a bacterial infection.    A chest X-ray. This is done if your healthcare provider thinks you have pneumonia.    Tests to check for an underlying condition. Other tests may be done to check for things such as allergies, asthma, or COPD (chronic obstructive pulmonary disease). You may need to see a specialist for more lung function testing.  Treating a cough  The main treatment for bronchitis is easing symptoms. Avoiding smoke, allergens, and other things that trigger coughing can often help. If the infection is bacterial, you may be given antibiotics. During the illness, it's important to get plenty of sleep. To ease symptoms:    Don t smoke. Also avoid secondhand smoke.    Use a humidifier. Or try breathing in steam from a hot shower. This may help loosen mucus.    Drink a lot of water and juice. They can soothe the throat and may help thin mucus.    Sit up or use extra pillows when in bed. This helps to lessen coughing and congestion.    Ask your provider about using medicine. Ask about using cough medicine, pain and fever medicine, or a decongestant.  Antibiotics  Most cases of bronchitis are caused by cold or flu viruses. They don t need antibiotics to treat them, even if your mucus is thick and green or yellow. Antibiotics don t treat viral illness  and antibiotics have not been shown to have any benefit in cases of acute bronchitis. Taking antibiotics when they are not needed increases your risk of getting an infection later that is antibiotic-resistant. Antibiotics can also cause severe cases of diarrhea that require other antibiotics to treat.  It is important that you accept your healthcare provider's opinion to not use antibiotics. Your provider will prescribe antibiotics if the infection is caused by bacteria. If they are prescribed:    Take all of the medicine. Take the medicine until it is used up, even if symptoms have improved. If you don t, the bronchitis may come back.    Take the medicines as directed. For instance, some medicines should be taken with food.    Ask about side effects. Ask your provider or pharmacist what side effects are common, and what to do about them.  Follow-up care  You should see your provider again in 2 to 3 weeks. By this time, symptoms should have improved. An infection that lasts longer may mean you have a more serious problem.  Prevention    Avoid tobacco smoke. If you smoke, quit. Stay away from smoky places. Ask friends and family not to smoke around you, or in your home or car.    Get checked for allergies.    Ask your provider about getting a yearly flu shot. Also ask about pneumococcal or pneumonia shots.    Wash your hands often. This helps reduce the chance of picking up viruses that cause colds and flu.  Call your healthcare provider if:    Symptoms worsen, or you have new symptoms    Breathing problems worsen or  become severe    Symptoms don t get better within a week, or within 3 days of taking antibiotics   Date Last Reviewed: 2/1/2017 2000-2017 [employer]. [address] [place] [address], [place] [address]. All rights reserved. This information is not intended as a substitute for professional medical care. Always follow your healthcare professional's instructions.          24 Hour Appointment  Hotline       To make an appointment at any [facility], call [phone] ([phone]). If you don't have a family doctor or clinic, we will help you find one. [facility] are conveniently located to serve the needs of you and your family.             Review of your medicines      START taking        Dose / Directions Last dose taken    benzonatate 100 MG capsule   Commonly known as:  TESSALON   Dose:  100 mg   Quantity:  42 capsule        Take 1 capsule (100 mg) by mouth 3 times daily as needed   Refills:  0        doxycycline 100 MG capsule   Commonly known as:  VIBRAMYCIN   Dose:  100 mg   Quantity:  20 capsule        Take 1 capsule (100 mg) by mouth 2 times daily for 10 days   Refills:  0                Prescriptions were sent or printed at these locations (2 Prescriptions)                   [facility] [place] [address] [facility]   [address] [facility] [address]    Telephone:  [phone]   Fax:  [phone]   Hours:                  E-Prescribed (2 of 2)         doxycycline (VIBRAMYCIN) 100 MG capsule               benzonatate (TESSALON) 100 MG capsule                Procedures and tests performed during your visit     Chest XR,  PA & LAT      Orders Needing Specimen Collection     None      Pending Results     Date and Time Order Name Status Description    2/15/2018 1803 Chest XR,  PA & LAT Preliminary             Pending Culture Results     No orders found from 2/13/2018 to 2/16/2018.            Pending Results Instructions     If you had any lab results that were not finalized at the time of your Discharge, you can call the ED Lab Result RN at [phone]. You will be contacted by this team for any positive Lab results or changes in treatment. The nurses are available 7 days a week from 10A to 6:30P.  You can leave a message 24 hours per day and they will return your call.        Test Results From Your Hospital Stay        2/15/2018  6:29 PM      Narrative      CHEST TWO VIEWS  2/15/2018 6:20 PM     HISTORY: Cough. Fever. Rule out pneumonia.    COMPARISON: Chest x-ray 4/20/2015.        Impression     IMPRESSION: PA and lateral views of the chest. Lungs are clear. Heart  is normal in size. No effusions are evident. No pneumothorax.                  Thank you for choosing [facility]       Thank you for choosing [facility] for your care. Our goal is always to provide you with excellent care. Hearing back from our patients is one way we can continue to improve our services. Please take a few minutes to complete the written survey that you may receive in the mail after you visit with us. Thank you!        [employer] Information     [employer] gives you secure access to your electronic health record. If you see a primary care provider, you can also send messages to your care team and make appointments. If you have questions, please call your primary care clinic.  If you do not have a primary care provider, please call [phone] and they will assist you.        Care EveryWhere ID     This is your Care EveryWhere ID. This could be used by other organizations to access your [facility] medical records  [id number]        Equal Access to Services     VANI [facility] CountySEFERINO : Hadii manuel Painting, waaxda lugreg, qaybta kaalart woods, cesar gutierrez . So [facility] [phone].    ATENCIÓN: Si habla español, tiene a torrez disposición servicios gratuitos de asistencia lingüística. Llame al [phone].    We comply with applicable federal civil rights laws and [place] laws. We do not discriminate on the basis of race, color, national origin, age, disability, sex, sexual orientation, or gender identity.            After Visit Summary       This is your record. Keep this with you and show to your community pharmacist(s) and doctor(s) at your next visit.

## 2018-02-15 NOTE — ED AVS SNAPSHOT
[facility] Emergency Department    [address] [facility] [address]    Phone:  [phone]    Fax:  [phone]                                       Ed Arshad   MRN: [id number]    Department:  [facility] Emergency Department   Date of Visit:  2/15/2018           After Visit Summary Signature Page     I have received my discharge instructions, and my questions have been answered. I have discussed any challenges I see with this plan with the nurse or doctor.    ..........................................................................................................................................  Patient/Patient Representative Signature      ..........................................................................................................................................  Patient Representative Print Name and Relationship to Patient    ..................................................               ................................................  Date                                            Time    ..........................................................................................................................................  Reviewed by Signature/Title    ...................................................              ..............................................  Date                                                            Time

## 2018-02-16 NOTE — ED PROVIDER NOTES
History     Chief Complaint   Patient presents with     Fever     Cough     HPI  Ed Arshad is a 42 year old male who is in urgent care with concern over fever and cough which been present for the last 2 weeks.  Patient states he developed symptoms approximately 2 weeks ago.  He initially thought he was improving however over the last 3-4 days states that cough is become increasingly severe with increased temp up to 102, night sweats, and chills.  He has not had any shortness of breath, wheezing, nausea, vomiting, abdominal pain.  Denies any history of asthma, COPD or other breathing related disorders.  No close contacts with similar symptoms.    Problem List:    Patient Active Problem List    Diagnosis Date Noted     Elevated blood pressure reading without diagnosis of hypertension 03/15/2016     Priority: Medium     Hyperlipidemia with target LDL less than 130 11/06/2014     Priority: Medium     Diagnosis updated by automated process. Provider to review and confirm.       Moderate recurrent major depression (H) 08/20/2012     Priority: Medium        Past Medical History:    No past medical history on file.    Past Surgical History:    Past Surgical History:   Procedure Laterality Date     IRRIGATION AND DEBRIDEMENT FINGER, COMBINED Right 3/28/2017    Procedure: COMBINED IRRIGATION AND DEBRIDEMENT FINGER;  Surgeon: Marion Lafleur MD;  Location: [facility] OR     UVULOPALATOPHARYNGOPLASTY  1/25/2012    Procedure:UVULOPALATOPHARYNGOPLASTY; Uvuloplasty  ; Surgeon:ARIC SAUCEDO; Location:[facility] OR     Family History:    Family History   Problem Relation Age of Onset     Hypertension Mother      Depression Mother      Other Cancer Mother      Skin cancer.     Hypertension Father      CEREBROVASCULAR DISEASE Father      Thyroid Disease Father      Prostate Cancer Father      Hypertension Brother      Social History:  Marital Status:  Single [1]  Social History   Substance Use Topics     Smoking status: Current  Every Day Smoker     Packs/day: 0.25     Years: 20.00     Types: Cigarettes     Smokeless tobacco: Never Used     Alcohol use Yes      Comment: weekends      Medications:      No current outpatient prescriptions on file.    Review of Systems  CONSTITUTIONAL:POSITIVE for fever up to 102, chills, sweats  INTEGUMENTARY/SKIN: NEGATIVE for worrisome rashes, moles or lesions  EYES: NEGATIVE for vision changes or irritation  ENT/MOUTH: NEGATIVE for current ear, mouth and throat problems  RESP:POSITIVE for cough and NEGATIVE for shortness of breath, wheezing   GI: NEGATIVE for abdominal pain, diarrhea, nausea and vomiting  Physical Exam   BP: 136/84  Pulse: 98  Temp: 99.7  F (37.6  C)  Resp: 20  SpO2: 99 %  Physical Exam    GENERAL APPEARANCE:alert and no distress  EYES: EOMI,  PERRL, conjunctiva clear  HENT: ear canals and TM's normal.  Nose and mouth without ulcers, erythema or lesions  NECK: supple, nontender, no lymphadenopathy  RESP: faint crackles at left base, no wheezing   CV: regular rates and rhythm, normal S1 S2, no murmur noted  SKIN: no suspicious lesions or rashes  ED Course     ED Course     Procedures        Critical Care time:  none            Labs Ordered and Resulted from Time of ED Arrival Up to the Time of Departure from the ED - No data to display    Results for orders placed or performed during the hospital encounter of 02/15/18   Chest XR,  PA & LAT    Narrative    CHEST TWO VIEWS  2/15/2018 6:20 PM     HISTORY: Cough. Fever. Rule out pneumonia.    COMPARISON: Chest x-ray 4/20/2015.      Impression    IMPRESSION: PA and lateral views of the chest. Lungs are clear. Heart  is normal in size. No effusions are evident. No pneumothorax.    ARIC MERRITT MD     Assessments & Plan (with Medical Decision Making)     I have reviewed the nursing notes.    I have reviewed the findings, diagnosis, plan and need for follow up with the patient.       Discharge Medication List as of 2/15/2018  6:38 PM      START  taking these medications    Details   doxycycline (VIBRAMYCIN) 100 MG capsule Take 1 capsule (100 mg) by mouth 2 times daily for 10 days, Disp-20 capsule, R-0, E-Prescribe      benzonatate (TESSALON) 100 MG capsule Take 1 capsule (100 mg) by mouth 3 times daily as needed, Disp-42 capsule, R-0, E-Prescribe           Final diagnoses:   Bronchitis     2-year-old male presents to urgent care with concern over 2 week history of cough with fever.  Patient was afebrile with stable vital signs upon arrival.  Physical exam findings as described above.  As part of evaluation patient did have chest x-ray which was negative for acute infiltrate, pneumothorax, pleural effusion or change in cardiopulmonary vasculature.  Patient symptoms are  clinically consistent with chest x-ray differential would also favor acute bronchitis pneumonia despite negative, concurrent viral illnesses would include possibility of influenza.  Patient was discharged home stable with instructions to follow-up with primary care provider if no resolution of fever within the next 48-72 hours.  Worrisome reasons to return to the ER/UC sooner discussed.    Disclaimer: This note consists of symbols derived from keyboarding, dictation, and/or voice recognition software. As a result, there may be errors in the script that have gone undetected.  Please consider this when interpreting information found in the chart.    2/15/2018   [facility] EMERGENCY DEPARTMENT     Chela Crenshaw PA-C  02/19/18 2004

## 2018-02-16 NOTE — DISCHARGE INSTRUCTIONS

## 2018-03-02 ENCOUNTER — OFFICE VISIT (OUTPATIENT)
Dept: FAMILY MEDICINE | Facility: CLINIC | Age: 43
End: 2018-03-02
Payer: COMMERCIAL

## 2018-03-02 VITALS
BODY MASS INDEX: 26.98 KG/M2 | RESPIRATION RATE: 16 BRPM | OXYGEN SATURATION: 97 % | HEART RATE: 91 BPM | TEMPERATURE: 98.3 F | DIASTOLIC BLOOD PRESSURE: 82 MMHG | HEIGHT: 68 IN | WEIGHT: 178 LBS | SYSTOLIC BLOOD PRESSURE: 133 MMHG

## 2018-03-02 DIAGNOSIS — H61.23 BILATERAL IMPACTED CERUMEN: Primary | ICD-10-CM

## 2018-03-02 PROCEDURE — 99212 OFFICE O/P EST SF 10 MIN: CPT | Performed by: FAMILY MEDICINE

## 2018-03-02 ASSESSMENT — PAIN SCALES - GENERAL: PAINLEVEL: NO PAIN (0)

## 2018-03-02 NOTE — MR AVS SNAPSHOT
After Visit Summary   3/2/2018    Ed Arshad    MRN: [id number]           Patient Information     Date Of Birth          1975        Visit Information        Provider Department      3/2/2018 1:20 PM Kevin Yu MD [facility]        Today's Diagnoses     Bilateral impacted cerumen    -  1      Care Instructions    (H61.23) Bilateral impacted cerumen  (primary encounter diagnosis)  Comment:   Plan: REMOVE IMPACTED CERUMEN        We will irrigate the ears and prevention discussed. Follow up as needed. Avoid Q-tips in the ears.           Follow-ups after your visit        Who to contact     If you have questions or need follow up information about today's clinic visit or your schedule please contact [facility] directly at [phone].  Normal or non-critical lab and imaging results will be communicated to you by MyChart, letter or phone within 4 business days after the clinic has received the results. If you do not hear from us within 7 days, please contact the clinic through MyChart or phone. If you have a critical or abnormal lab result, we will notify you by phone as soon as possible.  Submit refill requests through [employer] or call your pharmacy and they will forward the refill request to us. Please allow 3 business days for your refill to be completed.          Additional Information About Your Visit        MyChart Information     [employer] gives you secure access to your electronic health record. If you see a primary care provider, you can also send messages to your care team and make appointments. If you have questions, please call your primary care clinic.  If you do not have a primary care provider, please call [phone] and they will assist you.        Care EveryWhere ID     This is your Care EveryWhere ID. This could be used by other organizations to access your [facility] medical records  [id number]        Your Vitals Were     Pulse Temperature  "Respirations Height Pulse Oximetry BMI (Body Mass Index)    91 98.3  F (36.8  C) (Tympanic) 16 5' 8.25\" (1.734 m) 97% 26.87 kg/m2       Blood Pressure from Last 3 Encounters:   03/02/18 133/82   02/15/18 136/84   03/28/17 (!) 139/95    Weight from Last 3 Encounters:   03/02/18 178 lb (80.7 kg)   03/28/17 178 lb (80.7 kg)   02/14/17 182 lb (82.6 kg)              We Performed the Following     REMOVE IMPACTED CERUMEN        Primary Care Provider Office Phone # Fax #    Kevin Yu [phone] [address] [facility] [address]        Equal Access to Services     VANI COOPER : Jacques samaniego Soijeoma, waaxda luqadaha, qaybta kaalmada adeegyajavier, cesar gutierrez . So [facility] [phone].    ATENCIÓN: Si habla español, tiene a torrez disposición servicios gratuitos de asistencia lingüística. Llame al [phone].    We comply with applicable federal civil rights laws and [place] laws. We do not discriminate on the basis of race, color, national origin, age, disability, sex, sexual orientation, or gender identity.            Thank you!     Thank you for choosing [facility]  for your care. Our goal is always to provide you with excellent care. Hearing back from our patients is one way we can continue to improve our services. Please take a few minutes to complete the written survey that you may receive in the mail after your visit with us. Thank you!             Your Updated Medication List - Protect others around you: Learn how to safely use, store and throw away your medicines at www.disposemymeds.org.          This list is accurate as of 3/2/18  1:39 PM.  Always use your most recent med list.                   Brand Name Dispense Instructions for use Diagnosis    benzonatate 100 MG capsule    TESSALON    42 capsule    Take 1 capsule (100 mg) by mouth 3 times daily as needed          "

## 2018-03-02 NOTE — PROGRESS NOTES
"  SUBJECTIVE:   Ed Arshad is a 42 year old male who presents to clinic today for the following health issues:      ENT Symptoms             Symptoms: cc Present Absent Comment   Fever/Chills   *    Fatigue   *    Muscle Aches   *    Eye Irritation   *    Sneezing   *    Nasal Emir/Drg   *    Sinus Pressure/Pain   *    Loss of smell   *    Dental pain   *    Sore Throat   *    Swollen Glands   *    Ear Pain/Fullness  *  No pain left side is plugged    Cough   *    Wheeze   *    Chest Pain   *    Shortness of breath   *    Rash   *    Other   *      Symptom duration:  x 2 days   Symptom severity:  moderate   Treatments tried:  none    Contacts:  pt is just getting over bronchitis         Current Outpatient Prescriptions:      benzonatate (TESSALON) 100 MG capsule, Take 1 capsule (100 mg) by mouth 3 times daily as needed (Patient not taking: Reported on 3/2/2018), Disp: 42 capsule, Rfl: 0    Patient Active Problem List   Diagnosis     Moderate recurrent major depression (H)     Hyperlipidemia with target LDL less than 130     Elevated blood pressure reading without diagnosis of hypertension       Blood pressure 133/82, pulse 91, temperature 98.3  F (36.8  C), temperature source Tympanic, resp. rate 16, height 5' 8.25\" (1.734 m), weight 178 lb (80.7 kg), SpO2 97 %.    Exam:  GENERAL APPEARANCE: healthy, alert and no distress  HENT: cerumen bilateral  NECK: no adenopathy, no asymmetry, masses, or scars and thyroid normal to palpation      (H61.23) Bilateral impacted cerumen  (primary encounter diagnosis)  Comment:   Plan: REMOVE IMPACTED CERUMEN        We will irrigate the ears and prevention discussed. Follow up as needed. Avoid Q-tips in the ears.     Kevin Yu        "

## 2018-03-02 NOTE — PATIENT INSTRUCTIONS
(H61.23) Bilateral impacted cerumen  (primary encounter diagnosis)  Comment:   Plan: REMOVE IMPACTED CERUMEN        We will irrigate the ears and prevention discussed. Follow up as needed. Avoid Q-tips in the ears.

## 2018-07-05 ENCOUNTER — APPOINTMENT (OUTPATIENT)
Dept: CT IMAGING | Facility: CLINIC | Age: 43
End: 2018-07-05
Attending: EMERGENCY MEDICINE
Payer: COMMERCIAL

## 2018-07-05 ENCOUNTER — HOSPITAL ENCOUNTER (EMERGENCY)
Facility: CLINIC | Age: 43
Discharge: HOME | End: 2018-07-05
Attending: EMERGENCY MEDICINE | Admitting: EMERGENCY MEDICINE
Payer: COMMERCIAL

## 2018-07-05 VITALS
TEMPERATURE: 98.1 F | SYSTOLIC BLOOD PRESSURE: 114 MMHG | RESPIRATION RATE: 16 BRPM | WEIGHT: 176 LBS | DIASTOLIC BLOOD PRESSURE: 77 MMHG | BODY MASS INDEX: 26.57 KG/M2 | OXYGEN SATURATION: 98 % | HEART RATE: 103 BPM

## 2018-07-05 DIAGNOSIS — H53.8 BLURRED VISION: ICD-10-CM

## 2018-07-05 DIAGNOSIS — F10.920 ALCOHOLIC INTOXICATION WITHOUT COMPLICATION (H): ICD-10-CM

## 2018-07-05 DIAGNOSIS — R20.2 NUMBNESS AND TINGLING: ICD-10-CM

## 2018-07-05 DIAGNOSIS — R20.0 NUMBNESS AND TINGLING: ICD-10-CM

## 2018-07-05 LAB
ALBUMIN SERPL-MCNC: 4.4 G/DL (ref 3.4–5)
ALP SERPL-CCNC: 99 U/L (ref 40–150)
ALT SERPL W P-5'-P-CCNC: 27 U/L (ref 0–70)
ANION GAP SERPL CALCULATED.3IONS-SCNC: 10 MMOL/L (ref 3–14)
APTT PPP: 32 SEC (ref 22–37)
AST SERPL W P-5'-P-CCNC: 34 U/L (ref 0–45)
BASOPHILS # BLD AUTO: 0.1 10E9/L (ref 0–0.2)
BASOPHILS NFR BLD AUTO: 0.7 %
BILIRUB SERPL-MCNC: 0.5 MG/DL (ref 0.2–1.3)
BUN SERPL-MCNC: 12 MG/DL (ref 7–30)
CALCIUM SERPL-MCNC: 8.7 MG/DL (ref 8.5–10.1)
CHLORIDE SERPL-SCNC: 109 MMOL/L (ref 94–109)
CO2 SERPL-SCNC: 22 MMOL/L (ref 20–32)
CREAT SERPL-MCNC: 0.96 MG/DL (ref 0.66–1.25)
DIFFERENTIAL METHOD BLD: ABNORMAL
EOSINOPHIL # BLD AUTO: 0.3 10E9/L (ref 0–0.7)
EOSINOPHIL NFR BLD AUTO: 2.6 %
ERYTHROCYTE [DISTWIDTH] IN BLOOD BY AUTOMATED COUNT: 13.2 % (ref 10–15)
ETHANOL SERPL-MCNC: 0.3 G/DL
GFR SERPL CREATININE-BSD FRML MDRD: 85 ML/MIN/1.7M2
GLUCOSE SERPL-MCNC: 97 MG/DL (ref 70–99)
HCT VFR BLD AUTO: 47.1 % (ref 40–53)
HGB BLD-MCNC: 16.3 G/DL (ref 13.3–17.7)
IMM GRANULOCYTES # BLD: 0 10E9/L (ref 0–0.4)
IMM GRANULOCYTES NFR BLD: 0.4 %
INR PPP: 0.9 (ref 0.86–1.14)
LYMPHOCYTES # BLD AUTO: 5.4 10E9/L (ref 0.8–5.3)
LYMPHOCYTES NFR BLD AUTO: 53.8 %
MCH RBC QN AUTO: 30 PG (ref 26.5–33)
MCHC RBC AUTO-ENTMCNC: 34.6 G/DL (ref 31.5–36.5)
MCV RBC AUTO: 87 FL (ref 78–100)
MONOCYTES # BLD AUTO: 0.6 10E9/L (ref 0–1.3)
MONOCYTES NFR BLD AUTO: 6.2 %
NEUTROPHILS # BLD AUTO: 3.7 10E9/L (ref 1.6–8.3)
NEUTROPHILS NFR BLD AUTO: 36.3 %
NRBC # BLD AUTO: 0 10*3/UL
NRBC BLD AUTO-RTO: 0 /100
PLATELET # BLD AUTO: 241 10E9/L (ref 150–450)
PLATELET # BLD EST: ABNORMAL 10*3/UL
POTASSIUM SERPL-SCNC: 4.1 MMOL/L (ref 3.4–5.3)
PROT SERPL-MCNC: 7.9 G/DL (ref 6.8–8.8)
RBC # BLD AUTO: 5.44 10E12/L (ref 4.4–5.9)
RBC MORPH BLD: NORMAL
SODIUM SERPL-SCNC: 141 MMOL/L (ref 133–144)
TROPONIN I SERPL-MCNC: <0.015 UG/L (ref 0–0.04)
VARIANT LYMPHS BLD QL SMEAR: PRESENT
WBC # BLD AUTO: 10.1 10E9/L (ref 4–11)

## 2018-07-05 PROCEDURE — 99285 EMERGENCY DEPT VISIT HI MDM: CPT | Mod: 25 | Performed by: EMERGENCY MEDICINE

## 2018-07-05 PROCEDURE — 93010 ELECTROCARDIOGRAM REPORT: CPT | Mod: Z6 | Performed by: EMERGENCY MEDICINE

## 2018-07-05 PROCEDURE — 93005 ELECTROCARDIOGRAM TRACING: CPT | Performed by: EMERGENCY MEDICINE

## 2018-07-05 PROCEDURE — 70450 CT HEAD/BRAIN W/O DYE: CPT

## 2018-07-05 PROCEDURE — 25000128 H RX IP 250 OP 636: Performed by: EMERGENCY MEDICINE

## 2018-07-05 PROCEDURE — 84484 ASSAY OF TROPONIN QUANT: CPT | Performed by: EMERGENCY MEDICINE

## 2018-07-05 PROCEDURE — 85610 PROTHROMBIN TIME: CPT | Performed by: EMERGENCY MEDICINE

## 2018-07-05 PROCEDURE — 85730 THROMBOPLASTIN TIME PARTIAL: CPT | Performed by: EMERGENCY MEDICINE

## 2018-07-05 PROCEDURE — 25000125 ZZHC RX 250: Performed by: EMERGENCY MEDICINE

## 2018-07-05 PROCEDURE — 80053 COMPREHEN METABOLIC PANEL: CPT | Performed by: EMERGENCY MEDICINE

## 2018-07-05 PROCEDURE — 96361 HYDRATE IV INFUSION ADD-ON: CPT | Performed by: EMERGENCY MEDICINE

## 2018-07-05 PROCEDURE — 85025 COMPLETE CBC W/AUTO DIFF WBC: CPT | Performed by: EMERGENCY MEDICINE

## 2018-07-05 PROCEDURE — 70496 CT ANGIOGRAPHY HEAD: CPT

## 2018-07-05 PROCEDURE — 96374 THER/PROPH/DIAG INJ IV PUSH: CPT | Mod: 59 | Performed by: EMERGENCY MEDICINE

## 2018-07-05 PROCEDURE — 96375 TX/PRO/DX INJ NEW DRUG ADDON: CPT | Performed by: EMERGENCY MEDICINE

## 2018-07-05 PROCEDURE — 80320 DRUG SCREEN QUANTALCOHOLS: CPT | Performed by: EMERGENCY MEDICINE

## 2018-07-05 RX ORDER — KETOROLAC TROMETHAMINE 30 MG/ML
15 INJECTION, SOLUTION INTRAMUSCULAR; INTRAVENOUS ONCE
Status: COMPLETED | OUTPATIENT
Start: 2018-07-05 | End: 2018-07-05

## 2018-07-05 RX ORDER — IOPAMIDOL 755 MG/ML
70 INJECTION, SOLUTION INTRAVASCULAR ONCE
Status: COMPLETED | OUTPATIENT
Start: 2018-07-05 | End: 2018-07-05

## 2018-07-05 RX ORDER — FENTANYL CITRATE 50 UG/ML
50-100 INJECTION, SOLUTION INTRAMUSCULAR; INTRAVENOUS EVERY 5 MIN PRN
Status: DISCONTINUED | OUTPATIENT
Start: 2018-07-05 | End: 2018-07-05 | Stop reason: HOSPADM

## 2018-07-05 RX ADMIN — IOPAMIDOL 70 ML: 755 INJECTION, SOLUTION INTRAVENOUS at 01:18

## 2018-07-05 RX ADMIN — KETOROLAC TROMETHAMINE 15 MG: 30 INJECTION, SOLUTION INTRAMUSCULAR at 02:18

## 2018-07-05 RX ADMIN — SODIUM CHLORIDE 100 ML: 9 INJECTION, SOLUTION INTRAVENOUS at 01:17

## 2018-07-05 RX ADMIN — MIDAZOLAM HYDROCHLORIDE 1 MG: 2 INJECTION, SOLUTION INTRAMUSCULAR; INTRAVENOUS at 01:33

## 2018-07-05 RX ADMIN — SODIUM CHLORIDE, POTASSIUM CHLORIDE, SODIUM LACTATE AND CALCIUM CHLORIDE 1000 ML: 600; 310; 30; 20 INJECTION, SOLUTION INTRAVENOUS at 01:33

## 2018-07-05 ASSESSMENT — ENCOUNTER SYMPTOMS
HEADACHES: 1
NAUSEA: 0
SPEECH DIFFICULTY: 0
NUMBNESS: 1
CHILLS: 0
NERVOUS/ANXIOUS: 1
CHEST TIGHTNESS: 0
VOMITING: 0
LIGHT-HEADEDNESS: 0
CONFUSION: 1
PALPITATIONS: 0
DIZZINESS: 0
COUGH: 0
BACK PAIN: 0
FEVER: 0
SHORTNESS OF BREATH: 0
ABDOMINAL PAIN: 0
FATIGUE: 0
APPETITE CHANGE: 0

## 2018-07-05 ASSESSMENT — VISUAL ACUITY
OD: 20/70
OS: 20/70

## 2018-07-05 NOTE — DISCHARGE INSTRUCTIONS
"  Alcohol Intoxication  Alcohol intoxication occurs when you drink alcohol faster than your liver can remove it from your system. The following facts are important to remember:    It can take 10 minutes or more to start to feel the effects of a drink, so you can easily get more intoxicated than you intended.    One drink may be more than 1 serving of alcohol. Depending on the drink, it can be 2 to 4 servings.    It takes about an hour for your body to metabolize (clear) 1 serving. If you have more than 1 drink, it can take a couple of hours or more.    Many things affect how drinks will affect you, including whether you ve eaten, how fast you drink, your size, how much you normally drink (or not), medicines you take, chronic diseases you have, and gender.  Signs and symptoms of alcohol poisoning  The following are signs and symptoms of alcohol poisoning:  Mild impairment    Reduced inhibitions    Slurred speech    Drowsiness    Decreased fine motor skills  Moderate impairment    Erratic behavior, aggression, depression    Impaired judgment    Confusion    Concentration difficulties    Coordination problems  Severe impairment    Vomiting    Seizures    Unconsciousness    Cold, clammy    Slow or irregular breathing    Hypothermia (low body temperature)    Coma  Health effects  Alcohol abuse causes health problems. Sometimes this can happen after only drinking a  little.\" There is no set number of drinks or amount of alcohol that defines too much. The more you drink at one time, and the more frequently you drink determine both the short-term and long-term health effects. It affects all parts of your body and your health, including your:    Brain. Alcohol is a central nervous system depressant. It can damage parts of the brain that affect your balance, memory, thinking, and emotions. It can cause memory loss, blackouts, depression, agitation, sleep cycle changes, and seizures. These changes may or may not be " reversible.    Heart and vascular system. Alcohol affects multiple areas. It can damage heart muscle causing cardiomyopathy, which is a weakening and stretching of the heart muscle. This can lead to trouble breathing, an irregular heartbeat, atrial fibrillation, leg swelling, and heart failure. It makes the blood vessels stiffen causing hypertension (high blood pressure). All of these problems increase your risk of having heart attacks or strokes.    Liver. Alcohol causes fat to build up in the liver, affecting its normal function. This increases the risk for hepatitis, leading to abdominal pain, appetite loss, jaundice, bleeding problems, liver fibrosis, and cirrhosis. This in turn can affect your ability to fight off infections, and can cause diabetes. The liver changes prevent it from removing toxins in your blood that can cause encephalopathy. Signs of this are confusion, altered level of consciousness, personality changes, memory loss, seizures, coma, and death.    Pancreas. Alcohol can cause inflammation of the pancreas, or pancreatitis. This can cause pain in your abdomen, fever, and diabetes.    Immune system. Alcohol weakens your immune system in a number of ways. It suppresses your immune system making it harder to fight off infections and colds. You will also have a higher risk of certain infections like pneumonia and tuberculosis.    Cancer risk. Alcohol raises your risk of cancer of the mouth, esophagus, pharynx, larynx, liver, and breast.    Sexual function. Alcohol abuse can also lead to sexual problems.  Alcohol use during pregnancy may cause permanent damage to the growing baby.  Home care  The following guidelines will help you care for yourself at home:    Don't drink any more alcohol.    Don't drive until all effects of the alcohol have worn off.    Don't operate machinery that can cause injuries.    Get lots of rest over the next few days. Drink plenty of water and other non-alcoholic liquids.  Try to eat regular meals.    If you have been drinking heavily on a daily basis, you may go through alcohol withdrawal. The usual symptoms last 3 to 4 days and may include nervousness, shakiness, nausea, sweating, sleeplessness, and can even cause seizures and a serious withdrawal symptom called delirium tremens, or DTs. During this time, it is best that you stay with family or friends who can help and support you. You can also admit yourself to a residential detox program. If your symptoms are severe (seizures, severe shakiness, confusion), contact your doctor or call an ambulance for help (see below).   Follow-up care  If alcohol is a problem in your life, these are some organizations that can help you:    Alcoholics Anonymous offers support through a self-help fellowship. There are no dues or fees. See the Yellow Pages and call for time and place of meetings. Find AA online at www.aa.org.    [employer] offers support to families of alcohol users. Contact 649-117-0341, or online at www.al-anotano.org.    [employer] [place] on Alcoholism and Drug Dependence can be reached at 525-426-1773, or online at www.ncadd.org.    There are also inpatient and residential alcohol detox programs. Check the Internet or phonebook Yellow Pages under  D[facility] Abuse [facility] Treatment [facility].   Call 911  Call 911 if any of these occur:    Trouble breathing or slow irregular breathing    Chest pain    Sudden weakness on one side of your body or sudden trouble speaking    Heavy bleeding or vomiting blood    Very drowsy or trouble awakening    Fainting or loss of consciousness    Rapid heart rate    Seizure  When to seek medical advice  Call your healthcare provider right away if any of these occur:    Severe shakiness     Fever of 100.4 F (38 C) or higher, or as directed by your healthcare provider    Confusion or hallucinations (seeing, hearing, or feeling things that are not there)    Pain in your upper abdomen that gets worse    Repeated  "vomiting  Date Last Reviewed: 6/1/2016 2000-2017 [employer]. [address] [place] [address], [place] [address]. All rights reserved. This information is not intended as a substitute for professional medical care. Always follow your healthcare professional's instructions.          Paraesthesias  Paraesthesia is a burning or prickling sensation that is sometimes felt in the hands, arms, legs or feet. It can also occur in other parts of the body. It can also feel like tingling or numbness, skin crawling, or itching. The feeling is not comfortable, but it is not painful. (The \"pins and needles\" feeling that happens when a foot or hand \"falls asleep\" is a temporary paraesthesia.)  Paraesthesias that last or come and go may be caused by medical issues that need to be treated. These include stroke, a bulging disk pressing on a nerve, a trapped nerve, vitamin deficiencies, or even certain medicines.  Tests are often done. These tests may include blood tests, X-ray, CT (computerized tomography) scan, or a muscle test (electromyography). Depending on the cause, treatment may include physical therapy.  Home care    Tell the healthcare provider about all medicines you take. This includes prescription and over-the-counter medicines, vitamins, and herbs. Ask if any of the medicines may be causing your problems. Do not make any changes to prescription medicines without talking to your healthcare provider first.    You may be prescribed medicines to help relieve the tingling feeling or for pain. Take all medicines as directed.    A numb hand or foot may be more prone to injury. To help protect it:  ? Always use oven mitts.  ? Test water with an unaffected hand or foot.  ? Use caution when trimming nails. File sharp areas.  ? Wear shoes that fit well to avoid pressure points, blisters, and ulcers.  ? Inspect your hands and feet carefully (including the soles of your feet and between your toes) at least once a week. If you " see red areas, sores, or other problems, tell your healthcare provider.  Follow-up care  Follow up with your doctor or as advised by our staff. You may need further testing or evaluation.  When to seek medical advice  Call your healthcare provider right away if any of the following occur:    Numbness or weakness of the face, one arm, or one leg    Slurred speech, confusion, trouble speaking, walking, or seeing    Severe headache, fainting spell, dizziness, or seizure    Chest, arm, neck, or upper back pain    Loss of bladder or bowel control    Open wound with redness, swelling, or pus  Date Last Reviewed: 9/25/2015 2000-2017 [employer]. [address] [place] [address]. All rights reserved. This information is not intended as a substitute for professional medical care. Always follow your healthcare professional's instructions.

## 2018-07-05 NOTE — ED AVS SNAPSHOT
[facility] Emergency Department    [address] [address]    Phone:  [phone]    Fax:  [phone]                                       Ed Arshad   MRN: [id number]    Department:  [facility] Emergency Department   Date of Visit:  7/5/2018           After Visit Summary Signature Page     I have received my discharge instructions, and my questions have been answered. I have discussed any challenges I see with this plan with the nurse or doctor.    ..........................................................................................................................................  Patient/Patient Representative Signature      ..........................................................................................................................................  Patient Representative Print Name and Relationship to Patient    ..................................................               ................................................  Date                                            Time    ..........................................................................................................................................  Reviewed by Signature/Title    ...................................................              ..............................................  Date                                                            Time

## 2018-07-05 NOTE — ED PROVIDER NOTES
History     Chief Complaint   Patient presents with     Chest Pain     Altered Mental Status     HPI  Ed Arshad is a 43 year old male with history of hypertension, hyperlipidemia, and depression presenting for evaluation of a fall with confusion and left-sided tingling.  Patient admits to heavy drinking throughout the day today and being out in the sun all day.  Tonight while walking into his home he suddenly collapsed without a loss of conscious.  Patient's roommate/friends report that since that time he has complained of some chest pain as well as severe headache and also of left-sided tingling.  Patient denies previous history of headaches.  Patient does not believe he had a headache before the fall but does not recall for sure.  Patient reports that he also had some mild central chest pressure which has since fully resolved.  Denies any associated difficulty breathing or nausea.  Patient's primary complaint in the ED is of a severe global pressure-like headache more severe than any previous reported headaches.  Headache has only been present for less than 1 hour.  Patient does report a tingling sensation in both his left arm and leg but denies weakness.  Denies any difficulty with speech or swallowing.  Patient does report some confusion and patient's roommate also note he seems much more confused than normal.  Patient reports a family history positive for ischemic strokes.    Problem List:    Patient Active Problem List    Diagnosis Date Noted     Elevated blood pressure reading without diagnosis of hypertension 03/15/2016     Priority: Medium     Hyperlipidemia with target LDL less than 130 11/06/2014     Priority: Medium     Diagnosis updated by automated process. Provider to review and confirm.       Moderate recurrent major depression (H) 08/20/2012     Priority: Medium        Past Medical History:    No past medical history on file.    Past Surgical History:    Past Surgical History:   Procedure  Laterality Date     IRRIGATION AND DEBRIDEMENT FINGER, COMBINED Right 3/28/2017    Procedure: COMBINED IRRIGATION AND DEBRIDEMENT FINGER;  Surgeon: Marion Lafleur MD;  Location: [facility] OR     UVULOPALATOPHARYNGOPLASTY  1/25/2012    Procedure:UVULOPALATOPHARYNGOPLASTY; Uvuloplasty  ; Surgeon:ARIC SAUCEDO; Location:[facility] OR       Family History:    Family History   Problem Relation Age of Onset     Hypertension Mother      Depression Mother      Other Cancer Mother      Skin cancer.     Hypertension Father      Cerebrovascular Disease Father      Thyroid Disease Father      Prostate Cancer Father      Hypertension Brother        Social History:  Marital Status:  Single [1]  Social History   Substance Use Topics     Smoking status: Current Every Day Smoker     Packs/day: 0.25     Years: 20.00     Types: Cigarettes     Smokeless tobacco: Never Used     Alcohol use Yes      Comment: weekends        Medications:      benzonatate (TESSALON) 100 MG capsule         Review of Systems   Constitutional: Negative for appetite change, chills, fatigue and fever.   HENT: Negative for congestion.    Respiratory: Negative for cough, chest tightness and shortness of breath.    Cardiovascular: Positive for chest pain (now resolved). Negative for palpitations and leg swelling.   Gastrointestinal: Negative for abdominal pain, nausea and vomiting.   Genitourinary: Negative for decreased urine volume.   Musculoskeletal: Negative for back pain.   Skin: Negative for rash.   Neurological: Positive for syncope, numbness (left arm and leg) and headaches. Negative for dizziness, speech difficulty and light-headedness.   Psychiatric/Behavioral: Positive for confusion. The patient is nervous/anxious.    All other systems reviewed and are negative.      Physical Exam   BP: (!) 130/97  Pulse: 103  Heart Rate: 99  Temp: 98.1  F (36.7  C)  Resp: (!) 32  Weight: 79.8 kg (176 lb)  SpO2: 99 %  Visual Acuity-Left: 20/70  Visual Acuity-Right:  20/70      Physical Exam   Constitutional: He is oriented to person, place, and time. He appears well-developed and well-nourished.   Anxious appearing   HENT:   Head: Normocephalic and atraumatic.   Eyes: Conjunctivae are normal. Pupils are equal, round, and reactive to light.   Bilateral nystagmus present   Cardiovascular: Normal rate and regular rhythm.    No murmur heard.  Pulmonary/Chest: Effort normal and breath sounds normal.   Abdominal: Soft. There is no tenderness.   Neurological: He is alert and oriented to person, place, and time. Coordination (Normal finger to nose exam) normal.   Skin: Skin is warm and dry.   Psychiatric: His mood appears anxious. His speech is slurred. He is agitated.   Nursing note and vitals reviewed.      ED Course     ED Course     Procedures               EKG Interpretation:      Interpreted by Nayan Durant  Time reviewed: 0040  Symptoms at time of EKG: severe headache, chest pain (resolved)   Rhythm: Borderline sinus tachycardia  Rate: Normal  Axis: Normal  Ectopy: none  Conduction: normal  ST Segments/ T Waves: Early repolarization  Q Waves: none  Comparison to prior: Similar to previous EKG dated 3/19/2014    Clinical Impression: Sinus tachycardia without acute ischemic abnormality                  The patient has stroke symptoms:           ED Stroke specific documentation           NIHSS PDF          Protocol PDF     Patient last known well time: 1hr pta  ED Provider first to bedside at: 0035  CT Results received at: 0112  Patient was not treated with TPA due to the following reason(s):  Mild stroke symptoms ( NIHSS < 4 and not globally aphasic)    National Institutes of Health Stroke Scale (Baseline)  Time Performed: 0035      Score    Level of consciousness: (0)   Alert, keenly responsive    LOC questions: (0)   Answers both questions correctly    LOC commands: (0)   Performs both tasks correctly    Best gaze: (0)   Normal    Visual: (0)   No visual loss    Facial  palsy: (0)   Normal symmetrical movements    Motor arm (left): (0)   No drift    Motor arm (right): (0)   No drift    Motor leg (left): (0)   No drift    Motor leg (right): (0)   No drift    Limb ataxia: (0)   Absent    Sensory: (1)   Mild to moderate sensory loss    Best language: (0)   Normal- no aphasia    Dysarthria: (0)   Normal    Extinction and inattention: (0)   No abnormality        Total Score:  1        Stroke Mimics were considered (including migraine headache, seizure disorder, hypoglycemia (or hyperglycemia), head or spinal trauma, CNS infection, Toxin ingestion and shock state (e.g. sepsis) .               Results for orders placed or performed during the hospital encounter of 07/05/18 (from the past 24 hour(s))   INR   Result Value Ref Range    INR 0.90 0.86 - 1.14   Partial thromboplastin time   Result Value Ref Range    PTT 32 22 - 37 sec   CBC with platelets differential   Result Value Ref Range    WBC 10.1 4.0 - 11.0 10e9/L    RBC Count 5.44 4.4 - 5.9 10e12/L    Hemoglobin 16.3 13.3 - 17.7 g/dL    Hematocrit 47.1 40.0 - 53.0 %    MCV 87 78 - 100 fl    MCH 30.0 26.5 - 33.0 pg    MCHC 34.6 31.5 - 36.5 g/dL    RDW 13.2 10.0 - 15.0 %    Platelet Count 241 150 - 450 10e9/L    Diff Method Automated Method     % Neutrophils 36.3 %    % Lymphocytes 53.8 %    % Monocytes 6.2 %    % Eosinophils 2.6 %    % Basophils 0.7 %    % Immature Granulocytes 0.4 %    Nucleated RBCs 0 0 /100    Absolute Neutrophil 3.7 1.6 - 8.3 10e9/L    Absolute Lymphocytes 5.4 (H) 0.8 - 5.3 10e9/L    Absolute Monocytes 0.6 0.0 - 1.3 10e9/L    Absolute Eosinophils 0.3 0.0 - 0.7 10e9/L    Absolute Basophils 0.1 0.0 - 0.2 10e9/L    Abs Immature Granulocytes 0.0 0 - 0.4 10e9/L    Absolute Nucleated RBC 0.0     Reactive Lymphs Present     RBC Morphology Normal     Platelet Estimate       Automated count confirmed.  Platelet morphology is normal.   Comprehensive metabolic panel   Result Value Ref Range    Sodium 141 133 - 144 mmol/L     Potassium 4.1 3.4 - 5.3 mmol/L    Chloride 109 94 - 109 mmol/L    Carbon Dioxide 22 20 - 32 mmol/L    Anion Gap 10 3 - 14 mmol/L    Glucose 97 70 - 99 mg/dL    Urea Nitrogen 12 7 - 30 mg/dL    Creatinine 0.96 0.66 - 1.25 mg/dL    GFR Estimate 85 >60 mL/min/1.7m2    GFR Estimate If Black >90 >60 mL/min/1.7m2    Calcium 8.7 8.5 - 10.1 mg/dL    Bilirubin Total 0.5 0.2 - 1.3 mg/dL    Albumin 4.4 3.4 - 5.0 g/dL    Protein Total 7.9 6.8 - 8.8 g/dL    Alkaline Phosphatase 99 40 - 150 U/L    ALT 27 0 - 70 U/L    AST 34 0 - 45 U/L   Troponin I   Result Value Ref Range    Troponin I ES <0.015 0.000 - 0.045 ug/L   Alcohol level blood   Result Value Ref Range    Ethanol g/dL 0.30 (H) <0.01 g/dL       Medications   fentaNYL (PF) (SUBLIMAZE) injection  mcg (not administered)   0.9% sodium chloride BOLUS (500 mLs Intravenous Not Given 7/5/18 0302)   lactated ringers BOLUS 1,000 mL (0 mLs Intravenous Stopped 7/5/18 0301)   midazolam (VERSED) injection 1 mg (1 mg Intravenous Given 7/5/18 0133)   iopamidol (ISOVUE-370) solution 70 mL (70 mLs Intravenous Given 7/5/18 0118)   sodium chloride 0.9 % bag 500mL for CT scan flush use (100 mLs Intravenous Given 7/5/18 0117)   ketorolac (TORADOL) injection 15 mg (15 mg Intravenous Given 7/5/18 0218)     1:12 AM: Discussed with radiology Dr Lares re: CT results: NO acute finding on non-contrast CT head.    1:26 AM; Discussed with Dr Lares, radiology: CTA result normal    2:04 AM; PT re-assessed.  Feeling overall better but still with headache    2:55 AM: PT re-assessed. Headache improved. Minimal now. Repeat neuro exam shows no weakness. Still reporting subtle tingling in left toes. No weakness or drift.  Still reporting slight blurry vision but admits this is been present for more than a week.  Workup essentially negative with the exception of significant alcohol intoxication.    Assessments & Plan (with Medical Decision Making)  43-year-old male with history of hypertension and  depression presenting for evaluation of a fall with some reported confusion and left-sided tingling.  Patient reportedly was drinking a significant amount of alcohol throughout the day today.  Patient collapsed while walking into his home.  Patient reports that his left leg gave out.  No reported loss of consciousness.  Patient's roommate and friends report he seemed to be somewhat confused after this and was complaining of a severe headache.  Patient does not recall having a headache preceding the fall.  Patient also had a brief period of chest pain which resolved.  In the ED chest pain is fully resolved but he does have ongoing headache.  Initial neurologic exam showed no weakness but he did have some subjective decreased sensation of the left upper and lower extremities.  Given the reported acute onset of severe headache with syncope and collapse, and associated upper and lower extremity numbness, stroke evaluation performed.  Stroke CTs is obtained.  CT is fortunately negative for evidence of acute stroke.  After the negative CT, Toradol given for his headache.  Patient continued to have some slight distal foot tingling but otherwise normal neurologic exam.  Blood work essentially normal with the exception of an elevated alcohol.  Given the negative workup, and no findings on CT with very minimal neurologic symptoms, unlikely to represent acute stroke.  Patient feeling better and wanting to go home.  Recommended close follow-up with his primary care provider or return to ED if symptoms worsen.         I have reviewed the nursing notes.    I have reviewed the findings, diagnosis, plan and need for follow up with the patient.       Discharge Medication List as of 7/5/2018  3:02 AM          Final diagnoses:   Alcoholic intoxication without complication (H)   Numbness and tingling - left foot   Blurred vision       7/5/2018   [facility] EMERGENCY DEPARTMENT     Durant, Nayan Ford MD  07/05/18 0529

## 2018-07-05 NOTE — ED AVS SNAPSHOT
[facility] Emergency Department    [address] [facility] [address]    Phone:  [phone]    Fax:  [phone]                                       Ed Arshad   MRN: [id number]    Department:  [facility] Emergency Department   Date of Visit:  7/5/2018           Patient Information     Date Of Birth          1975        Your diagnoses for this visit were:     Alcoholic intoxication without complication (H)     Numbness and tingling left foot    Blurred vision        You were seen by Nayan Durant MD.      Follow-up Information     Follow up with Kevin Yu MD. Schedule an appointment as soon as possible for a visit in 2 days.    Specialty:  Family Practice    Why:  For follow up    Contact information:    [address] [facility] [address]  [phone]          Schedule an appointment as soon as possible for a visit with [facility].    Why:  For follow up of your blurry vision    Contact information:    [address] [facility] [address] [phone]        Discharge Instructions         Alcohol Intoxication  Alcohol intoxication occurs when you drink alcohol faster than your liver can remove it from your system. The following facts are important to remember:    It can take 10 minutes or more to start to feel the effects of a drink, so you can easily get more intoxicated than you intended.    One drink may be more than 1 serving of alcohol. Depending on the drink, it can be 2 to 4 servings.    It takes about an hour for your body to metabolize (clear) 1 serving. If you have more than 1 drink, it can take a couple of hours or more.    Many things affect how drinks will affect you, including whether you ve eaten, how fast you drink, your size, how much you normally drink (or not), medicines you take, chronic diseases you have, and gender.  Signs and symptoms of alcohol poisoning  The following are signs and symptoms of alcohol poisoning:  Mild  "impairment    Reduced inhibitions    Slurred speech    Drowsiness    Decreased fine motor skills  Moderate impairment    Erratic behavior, aggression, depression    Impaired judgment    Confusion    Concentration difficulties    Coordination problems  Severe impairment    Vomiting    Seizures    Unconsciousness    Cold, clammy    Slow or irregular breathing    Hypothermia (low body temperature)    Coma  Health effects  Alcohol abuse causes health problems. Sometimes this can happen after only drinking a  little.\" There is no set number of drinks or amount of alcohol that defines too much. The more you drink at one time, and the more frequently you drink determine both the short-term and long-term health effects. It affects all parts of your body and your health, including your:    Brain. Alcohol is a central nervous system depressant. It can damage parts of the brain that affect your balance, memory, thinking, and emotions. It can cause memory loss, blackouts, depression, agitation, sleep cycle changes, and seizures. These changes may or may not be reversible.    Heart and vascular system. Alcohol affects multiple areas. It can damage heart muscle causing cardiomyopathy, which is a weakening and stretching of the heart muscle. This can lead to trouble breathing, an irregular heartbeat, atrial fibrillation, leg swelling, and heart failure. It makes the blood vessels stiffen causing hypertension (high blood pressure). All of these problems increase your risk of having heart attacks or strokes.    Liver. Alcohol causes fat to build up in the liver, affecting its normal function. This increases the risk for hepatitis, leading to abdominal pain, appetite loss, jaundice, bleeding problems, liver fibrosis, and cirrhosis. This in turn can affect your ability to fight off infections, and can cause diabetes. The liver changes prevent it from removing toxins in your blood that can cause encephalopathy. Signs of this are " confusion, altered level of consciousness, personality changes, memory loss, seizures, coma, and death.    Pancreas. Alcohol can cause inflammation of the pancreas, or pancreatitis. This can cause pain in your abdomen, fever, and diabetes.    Immune system. Alcohol weakens your immune system in a number of ways. It suppresses your immune system making it harder to fight off infections and colds. You will also have a higher risk of certain infections like pneumonia and tuberculosis.    Cancer risk. Alcohol raises your risk of cancer of the mouth, esophagus, pharynx, larynx, liver, and breast.    Sexual function. Alcohol abuse can also lead to sexual problems.  Alcohol use during pregnancy may cause permanent damage to the growing baby.  Home care  The following guidelines will help you care for yourself at home:    Don't drink any more alcohol.    Don't drive until all effects of the alcohol have worn off.    Don't operate machinery that can cause injuries.    Get lots of rest over the next few days. Drink plenty of water and other non-alcoholic liquids. Try to eat regular meals.    If you have been drinking heavily on a daily basis, you may go through alcohol withdrawal. The usual symptoms last 3 to 4 days and may include nervousness, shakiness, nausea, sweating, sleeplessness, and can even cause seizures and a serious withdrawal symptom called delirium tremens, or DTs. During this time, it is best that you stay with family or friends who can help and support you. You can also admit yourself to a residential detox program. If your symptoms are severe (seizures, severe shakiness, confusion), contact your doctor or call an ambulance for help (see below).   Follow-up care  If alcohol is a problem in your life, these are some organizations that can help you:    Alcoholics Anonymous offers support through a self-help fellowship. There are no dues or fees. See the Yellow Pages and call for time and place of meetings. Find  "AA online at www.aa.org.    [employer] offers support to families of alcohol users. Contact 571-662-5606, or online at www.al-anon.org.    National [employer] on Alcoholism and Drug Dependence can be reached at 328-478-5274, or online at www.ncadd.org.    There are also inpatient and residential alcohol detox programs. Check the Internet or phonebook Yellow Pages under  Drug Abuse and Treatment Centers.   Call 911  Call 911 if any of these occur:    Trouble breathing or slow irregular breathing    Chest pain    Sudden weakness on one side of your body or sudden trouble speaking    Heavy bleeding or vomiting blood    Very drowsy or trouble awakening    Fainting or loss of consciousness    Rapid heart rate    Seizure  When to seek medical advice  Call your healthcare provider right away if any of these occur:    Severe shakiness     Fever of 100.4 F (38 C) or higher, or as directed by your healthcare provider    Confusion or hallucinations (seeing, hearing, or feeling things that are not there)    Pain in your upper abdomen that gets worse    Repeated vomiting  Date Last Reviewed: 6/1/2016 2000-2017 [employer]. [address] [place] [address]. All rights reserved. This information is not intended as a substitute for professional medical care. Always follow your healthcare professional's instructions.          Paraesthesias  Paraesthesia is a burning or prickling sensation that is sometimes felt in the hands, arms, legs or feet. It can also occur in other parts of the body. It can also feel like tingling or numbness, skin crawling, or itching. The feeling is not comfortable, but it is not painful. (The \"pins and needles\" feeling that happens when a foot or hand \"falls asleep\" is a temporary paraesthesia.)  Paraesthesias that last or come and go may be caused by medical issues that need to be treated. These include stroke, a bulging disk pressing on a nerve, a trapped nerve, vitamin deficiencies, " or even certain medicines.  Tests are often done. These tests may include blood tests, X-ray, CT (computerized tomography) scan, or a muscle test (electromyography). Depending on the cause, treatment may include physical therapy.  Home care    Tell the healthcare provider about all medicines you take. This includes prescription and over-the-counter medicines, vitamins, and herbs. Ask if any of the medicines may be causing your problems. Do not make any changes to prescription medicines without talking to your healthcare provider first.    You may be prescribed medicines to help relieve the tingling feeling or for pain. Take all medicines as directed.    A numb hand or foot may be more prone to injury. To help protect it:  ? Always use oven mitts.  ? Test water with an unaffected hand or foot.  ? Use caution when trimming nails. File sharp areas.  ? Wear shoes that fit well to avoid pressure points, blisters, and ulcers.  ? Inspect your hands and feet carefully (including the soles of your feet and between your toes) at least once a week. If you see red areas, sores, or other problems, tell your healthcare provider.  Follow-up care  Follow up with your doctor or as advised by our staff. You may need further testing or evaluation.  When to seek medical advice  Call your healthcare provider right away if any of the following occur:    Numbness or weakness of the face, one arm, or one leg    Slurred speech, confusion, trouble speaking, walking, or seeing    Severe headache, fainting spell, dizziness, or seizure    Chest, arm, neck, or upper back pain    Loss of bladder or bowel control    Open wound with redness, swelling, or pus  Date Last Reviewed: 9/25/2015 2000-2017 [employer]. [address] [place] [address], [place] [address]. All rights reserved. This information is not intended as a substitute for professional medical care. Always follow your healthcare professional's instructions.          24 Hour  Appointment Hotline       To make an appointment at any [facility], call [phone] ([phone]). If you don't have a family doctor or clinic, we will help you find one. [place] clinics are conveniently located to serve the needs of you and your family.             Review of your medicines      Our records show that you are taking the medicines listed below. If these are incorrect, please call your family doctor or clinic.        Dose / Directions Last dose taken    benzonatate 100 MG capsule   Commonly known as:  TESSALON   Dose:  100 mg   Quantity:  42 capsule        Take 1 capsule (100 mg) by mouth 3 times daily as needed   Refills:  0                Procedures and tests performed during your visit     Alcohol level blood    CBC with platelets differential    CT Head Neck Angio w/o & w Contrast    Comprehensive metabolic panel    EKG 12-lead, tracing only    Head CT w/o contrast    INR    Partial thromboplastin time    Troponin I      Orders Needing Specimen Collection     None      Pending Results     Date and Time Order Name Status Description    7/5/2018 0048 CT Head Neck Angio w/o & w Contrast In process     7/5/2018 0042 Head CT w/o contrast In process             Pending Culture Results     No orders found from 7/3/2018 to 7/6/2018.            Pending Results Instructions     If you had any lab results that were not finalized at the time of your Discharge, you can call the ED Lab Result RN at [phone]. You will be contacted by this team for any positive Lab results or changes in treatment. The nurses are available 7 days a week from 10A to 6:30P.  You can leave a message 24 hours per day and they will return your call.        Test Results From Your Hospital Stay        7/5/2018  1:25 AM      Component Results     Component Value Ref Range & Units Status    WBC 10.1 4.0 - 11.0 10e9/L Final    RBC Count 5.44 4.4 - 5.9 10e12/L Final    Hemoglobin 16.3 13.3 - 17.7 g/dL Final    Hematocrit 47.1  40.0 - 53.0 % Final    MCV 87 78 - 100 fl Final    MCH 30.0 26.5 - 33.0 pg Final    MCHC 34.6 31.5 - 36.5 g/dL Final    RDW 13.2 10.0 - 15.0 % Final    Platelet Count 241 150 - 450 10e9/L Final    Diff Method Automated Method  Final    % Neutrophils 36.3 % Final    % Lymphocytes 53.8 % Final    % Monocytes 6.2 % Final    % Eosinophils 2.6 % Final    % Basophils 0.7 % Final    % Immature Granulocytes 0.4 % Final    Nucleated RBCs 0 0 /100 Final    Absolute Neutrophil 3.7 1.6 - 8.3 10e9/L Final    Absolute Lymphocytes 5.4 (H) 0.8 - 5.3 10e9/L Final    Absolute Monocytes 0.6 0.0 - 1.3 10e9/L Final    Absolute Eosinophils 0.3 0.0 - 0.7 10e9/L Final    Absolute Basophils 0.1 0.0 - 0.2 10e9/L Final    Abs Immature Granulocytes 0.0 0 - 0.4 10e9/L Final    Absolute Nucleated RBC 0.0  Final    Reactive Lymphs Present  Final    RBC Morphology Normal  Final    Platelet Estimate   Final    Automated count confirmed.  Platelet morphology is normal.         7/5/2018  1:31 AM      Component Results     Component Value Ref Range & Units Status    Sodium 141 133 - 144 mmol/L Final    Potassium 4.1 3.4 - 5.3 mmol/L Final    Specimen slightly hemolyzed, potassium may be falsely elevated    Chloride 109 94 - 109 mmol/L Final    Carbon Dioxide 22 20 - 32 mmol/L Final    Anion Gap 10 3 - 14 mmol/L Final    Glucose 97 70 - 99 mg/dL Final    Urea Nitrogen 12 7 - 30 mg/dL Final    Creatinine 0.96 0.66 - 1.25 mg/dL Final    GFR Estimate 85 >60 mL/min/1.7m2 Final    Non  GFR Calc    GFR Estimate If Black >90 >60 mL/min/1.7m2 Final    African American GFR Calc    Calcium 8.7 8.5 - 10.1 mg/dL Final    Bilirubin Total 0.5 0.2 - 1.3 mg/dL Final    Albumin 4.4 3.4 - 5.0 g/dL Final    Protein Total 7.9 6.8 - 8.8 g/dL Final    Alkaline Phosphatase 99 40 - 150 U/L Final    ALT 27 0 - 70 U/L Final    AST 34 0 - 45 U/L Final    Specimen is hemolyzed which can falsely elevate AST. Analysis of a   non-hemolyzed specimen may result in a  lower value.           7/5/2018  1:31 AM      Component Results     Component Value Ref Range & Units Status    Troponin I ES <0.015 0.000 - 0.045 ug/L Final    The 99th percentile for upper reference range is 0.045 ug/L.  Troponin values   in the range of 0.045 - 0.120 ug/L may be associated with risks of adverse   clinical events.           7/5/2018 12:53 AM      Result not yet available     Exam Ended         7/5/2018  2:39 AM      Component Results     Component Value Ref Range & Units Status    INR 0.90 0.86 - 1.14 Final         7/5/2018  2:39 AM      Component Results     Component Value Ref Range & Units Status    PTT 32 22 - 37 sec Final               7/5/2018  1:16 AM      Result not yet available     Exam Ended         7/5/2018  1:31 AM      Component Results     Component Value Ref Range & Units Status    Ethanol g/dL 0.30 (H) <0.01 g/dL Final                Thank you for choosing [facility]       Thank you for choosing [facility] for your care. Our goal is always to provide you with excellent care. Hearing back from our patients is one way we can continue to improve our services. Please take a few minutes to complete the written survey that you may receive in the mail after you visit with us. Thank you!        [employer] Information     [employer] gives you secure access to your electronic health record. If you see a primary care provider, you can also send messages to your care team and make appointments. If you have questions, please call your primary care clinic.  If you do not have a primary care provider, please call [phone] and they will assist you.        Care EveryWhere ID     This is your Care EveryWhere ID. This could be used by other organizations to access your [facility] medical records  [id number]        Equal Access to Services     VANI COOPER : [employer], mari james, cesar baldwin. So [facility]  [phone].    ATENCIÓN: Si habla español, tiene a torrez disposición servicios gratuitos de asistencia lingüística. Llame al [phone].    We comply with applicable federal civil rights laws and [place] laws. We do not discriminate on the basis of race, color, national origin, age, disability, sex, sexual orientation, or gender identity.            After Visit Summary       This is your record. Keep this with you and show to your community pharmacist(s) and doctor(s) at your next visit.

## 2018-07-05 NOTE — ED NOTES
"Pt present to ED with roommates in an acute agitated states saying that his chest and head were on fire, reports his vision \"looks like shadows\". Pt tangential thoughts, inability to focus and frequently needs to be reoriented to place and situation. Pt has difficulty recalling events he did during the day. Pt minerva of alcohol but denies any other drug use. Roommates report that they were at a picnic today out in the sun were drinking alcohol.  Pt's roommates report that he had a near syncope episode PTA and then started to complain of CP. Pt now complains of headache. Speech is clear, but pt as questions repeatedly and need to be reoriented.   "

## 2019-11-06 ENCOUNTER — HEALTH MAINTENANCE LETTER (OUTPATIENT)
Age: 44
End: 2019-11-06

## 2020-11-29 ENCOUNTER — HEALTH MAINTENANCE LETTER (OUTPATIENT)
Age: 45
End: 2020-11-29

## 2021-09-19 ENCOUNTER — HEALTH MAINTENANCE LETTER (OUTPATIENT)
Age: 46
End: 2021-09-19

## 2022-01-09 ENCOUNTER — HEALTH MAINTENANCE LETTER (OUTPATIENT)
Age: 47
End: 2022-01-09

## 2022-11-21 ENCOUNTER — HEALTH MAINTENANCE LETTER (OUTPATIENT)
Age: 47
End: 2022-11-21

## 2023-04-16 ENCOUNTER — HEALTH MAINTENANCE LETTER (OUTPATIENT)
Age: 48
End: 2023-04-16

## (undated) DEVICE — SOL WATER IRRIG 1000ML BOTTLE 07139-09

## (undated) DEVICE — GLOVE PROTEXIS BLUE W/NEU-THERA 7.5  2D73EB75

## (undated) DEVICE — DECANTER VIAL 2006S

## (undated) DEVICE — PACK HAND

## (undated) DEVICE — DRAPE SHEET REV FOLD 3/4 9349

## (undated) DEVICE — ESU HOLSTER PLASTIC DISP E2400

## (undated) DEVICE — DRSG ADAPTIC 3X3" 6112

## (undated) DEVICE — ESU CORD BIPOLAR GREEN 10-4000

## (undated) DEVICE — SOL NACL 0.9% IRRIG 1000ML BOTTLE 07138-09

## (undated) DEVICE — BNDG ELASTIC 4"X5YDS UNSTERILE 6611-40

## (undated) DEVICE — BLADE KNIFE BEAVER 376700

## (undated) DEVICE — SU ETHILON 4-0 PS-2 18" 1667G

## (undated) DEVICE — PREP SKIN SCRUB TRAY 4461A

## (undated) DEVICE — GLOVE PROTEXIS W/NEU-THERA 7.0  2D73TE70

## (undated) DEVICE — GLOVE PROTEXIS W/NEU-THERA 7.5  2D73TE75

## (undated) DEVICE — BNDG KLING 2" 2231

## (undated) DEVICE — SOL NACL 0.9% IRRIG 3000ML BAG 07972-08

## (undated) DEVICE — DRSG GAUZE 4X4" TRAY

## (undated) DEVICE — TUBING CYSTO/BLADDER IRRIG SET 80" 06544-01

## (undated) DEVICE — DRAPE STERI TOWEL SM 1000

## (undated) DEVICE — GOWN IMPERVIOUS SPECIALTY XLG/XLONG 32474

## (undated) DEVICE — DRSG STERI STRIP 1/2X4" R1547

## (undated) RX ORDER — GINSENG 100 MG
CAPSULE ORAL
Status: DISPENSED
Start: 2017-03-28

## (undated) RX ORDER — LIDOCAINE HYDROCHLORIDE 10 MG/ML
INJECTION, SOLUTION INFILTRATION; PERINEURAL
Status: DISPENSED
Start: 2017-03-28

## (undated) RX ORDER — BUPIVACAINE HYDROCHLORIDE 5 MG/ML
INJECTION, SOLUTION PERINEURAL
Status: DISPENSED
Start: 2017-03-28